# Patient Record
Sex: MALE | Race: ASIAN | NOT HISPANIC OR LATINO | URBAN - METROPOLITAN AREA
[De-identification: names, ages, dates, MRNs, and addresses within clinical notes are randomized per-mention and may not be internally consistent; named-entity substitution may affect disease eponyms.]

---

## 2021-01-01 ENCOUNTER — INPATIENT (INPATIENT)
Facility: HOSPITAL | Age: 86
LOS: 3 days | End: 2021-02-08
Attending: INTERNAL MEDICINE | Admitting: INTERNAL MEDICINE
Payer: MEDICARE

## 2021-01-01 VITALS
SYSTOLIC BLOOD PRESSURE: 79 MMHG | TEMPERATURE: 102 F | DIASTOLIC BLOOD PRESSURE: 60 MMHG | HEART RATE: 169 BPM | OXYGEN SATURATION: 77 % | RESPIRATION RATE: 31 BRPM

## 2021-01-01 VITALS
DIASTOLIC BLOOD PRESSURE: 99 MMHG | OXYGEN SATURATION: 100 % | HEART RATE: 62 BPM | SYSTOLIC BLOOD PRESSURE: 172 MMHG | RESPIRATION RATE: 18 BRPM

## 2021-01-01 DIAGNOSIS — Z51.5 ENCOUNTER FOR PALLIATIVE CARE: ICD-10-CM

## 2021-01-01 DIAGNOSIS — I61.9 NONTRAUMATIC INTRACEREBRAL HEMORRHAGE, UNSPECIFIED: ICD-10-CM

## 2021-01-01 DIAGNOSIS — J96.01 ACUTE RESPIRATORY FAILURE WITH HYPOXIA: ICD-10-CM

## 2021-01-01 LAB
A1C WITH ESTIMATED AVERAGE GLUCOSE RESULT: 5.5 % — SIGNIFICANT CHANGE UP (ref 4–5.6)
ALBUMIN SERPL ELPH-MCNC: 1.8 G/DL — LOW (ref 3.3–5)
ALBUMIN SERPL ELPH-MCNC: 2 G/DL — LOW (ref 3.3–5)
ALBUMIN SERPL ELPH-MCNC: 3.7 G/DL — SIGNIFICANT CHANGE UP (ref 3.3–5)
ALP SERPL-CCNC: 50 U/L — SIGNIFICANT CHANGE UP (ref 40–120)
ALP SERPL-CCNC: 77 U/L — SIGNIFICANT CHANGE UP (ref 40–120)
ALT FLD-CCNC: 22 U/L — SIGNIFICANT CHANGE UP (ref 4–41)
ALT FLD-CCNC: 31 U/L — SIGNIFICANT CHANGE UP (ref 4–41)
ANION GAP SERPL CALC-SCNC: 11 MMOL/L — SIGNIFICANT CHANGE UP (ref 7–14)
ANION GAP SERPL CALC-SCNC: 11 MMOL/L — SIGNIFICANT CHANGE UP (ref 7–14)
ANION GAP SERPL CALC-SCNC: 13 MMOL/L — SIGNIFICANT CHANGE UP (ref 7–14)
ANION GAP SERPL CALC-SCNC: 21 MMOL/L — HIGH (ref 7–14)
ANION GAP SERPL CALC-SCNC: 9 MMOL/L — SIGNIFICANT CHANGE UP (ref 7–14)
APTT BLD: 32.2 SEC — SIGNIFICANT CHANGE UP (ref 27–36.3)
APTT BLD: 37 SEC — HIGH (ref 27–36.3)
APTT BLD: 39.1 SEC — HIGH (ref 27–36.3)
APTT BLD: 43.5 SEC — HIGH (ref 27–36.3)
AST SERPL-CCNC: 23 U/L — SIGNIFICANT CHANGE UP (ref 4–40)
AST SERPL-CCNC: 64 U/L — HIGH (ref 4–40)
BASE EXCESS BLDV CALC-SCNC: -1 MMOL/L — SIGNIFICANT CHANGE UP (ref -3–2)
BASOPHILS # BLD AUTO: 0 K/UL — SIGNIFICANT CHANGE UP (ref 0–0.2)
BASOPHILS # BLD AUTO: 0.05 K/UL — SIGNIFICANT CHANGE UP (ref 0–0.2)
BASOPHILS NFR BLD AUTO: 0 % — SIGNIFICANT CHANGE UP (ref 0–2)
BASOPHILS NFR BLD AUTO: 0.5 % — SIGNIFICANT CHANGE UP (ref 0–2)
BILIRUB SERPL-MCNC: 0.6 MG/DL — SIGNIFICANT CHANGE UP (ref 0.2–1.2)
BILIRUB SERPL-MCNC: 1.2 MG/DL — SIGNIFICANT CHANGE UP (ref 0.2–1.2)
BLD GP AB SCN SERPL QL: NEGATIVE — SIGNIFICANT CHANGE UP
BLD GP AB SCN SERPL QL: NEGATIVE — SIGNIFICANT CHANGE UP
BLOOD GAS ARTERIAL COMPREHENSIVE RESULT: SIGNIFICANT CHANGE UP
BLOOD GAS ARTERIAL COMPREHENSIVE RESULT: SIGNIFICANT CHANGE UP
BLOOD GAS ARTERIAL COMPREHENSIVE WITH CREATININE RESULT: SIGNIFICANT CHANGE UP
BUN SERPL-MCNC: 18 MG/DL — SIGNIFICANT CHANGE UP (ref 7–23)
BUN SERPL-MCNC: 19 MG/DL — SIGNIFICANT CHANGE UP (ref 7–23)
BUN SERPL-MCNC: 24 MG/DL — HIGH (ref 7–23)
BUN SERPL-MCNC: 29 MG/DL — HIGH (ref 7–23)
BUN SERPL-MCNC: 36 MG/DL — HIGH (ref 7–23)
BUN SERPL-MCNC: 38 MG/DL — HIGH (ref 7–23)
BUN SERPL-MCNC: 44 MG/DL — HIGH (ref 7–23)
CALCIUM SERPL-MCNC: 6.4 MG/DL — CRITICAL LOW (ref 8.4–10.5)
CALCIUM SERPL-MCNC: 6.5 MG/DL — CRITICAL LOW (ref 8.4–10.5)
CALCIUM SERPL-MCNC: 6.5 MG/DL — CRITICAL LOW (ref 8.4–10.5)
CALCIUM SERPL-MCNC: 8.3 MG/DL — LOW (ref 8.4–10.5)
CALCIUM SERPL-MCNC: 8.6 MG/DL — SIGNIFICANT CHANGE UP (ref 8.4–10.5)
CALCIUM SERPL-MCNC: 8.7 MG/DL — SIGNIFICANT CHANGE UP (ref 8.4–10.5)
CALCIUM SERPL-MCNC: 9.1 MG/DL — SIGNIFICANT CHANGE UP (ref 8.4–10.5)
CHLORIDE SERPL-SCNC: 104 MMOL/L — SIGNIFICANT CHANGE UP (ref 98–107)
CHLORIDE SERPL-SCNC: 106 MMOL/L — SIGNIFICANT CHANGE UP (ref 98–107)
CHLORIDE SERPL-SCNC: 107 MMOL/L — SIGNIFICANT CHANGE UP (ref 98–107)
CHLORIDE SERPL-SCNC: 108 MMOL/L — HIGH (ref 98–107)
CHLORIDE SERPL-SCNC: 109 MMOL/L — HIGH (ref 98–107)
CHLORIDE SERPL-SCNC: 112 MMOL/L — HIGH (ref 98–107)
CHLORIDE SERPL-SCNC: 91 MMOL/L — LOW (ref 98–107)
CHOLEST SERPL-MCNC: 157 MG/DL — SIGNIFICANT CHANGE UP
CO2 SERPL-SCNC: 15 MMOL/L — LOW (ref 22–31)
CO2 SERPL-SCNC: 18 MMOL/L — LOW (ref 22–31)
CO2 SERPL-SCNC: 22 MMOL/L — SIGNIFICANT CHANGE UP (ref 22–31)
CO2 SERPL-SCNC: 23 MMOL/L — SIGNIFICANT CHANGE UP (ref 22–31)
CO2 SERPL-SCNC: 25 MMOL/L — SIGNIFICANT CHANGE UP (ref 22–31)
CREAT SERPL-MCNC: 1.02 MG/DL — SIGNIFICANT CHANGE UP (ref 0.5–1.3)
CREAT SERPL-MCNC: 1.26 MG/DL — SIGNIFICANT CHANGE UP (ref 0.5–1.3)
CREAT SERPL-MCNC: 2.1 MG/DL — HIGH (ref 0.5–1.3)
CREAT SERPL-MCNC: 2.18 MG/DL — HIGH (ref 0.5–1.3)
CREAT SERPL-MCNC: 2.28 MG/DL — HIGH (ref 0.5–1.3)
CREAT SERPL-MCNC: 2.36 MG/DL — HIGH (ref 0.5–1.3)
CREAT SERPL-MCNC: 2.93 MG/DL — HIGH (ref 0.5–1.3)
CULTURE RESULTS: SIGNIFICANT CHANGE UP
EOSINOPHIL # BLD AUTO: 0 K/UL — SIGNIFICANT CHANGE UP (ref 0–0.5)
EOSINOPHIL # BLD AUTO: 0.03 K/UL — SIGNIFICANT CHANGE UP (ref 0–0.5)
EOSINOPHIL NFR BLD AUTO: 0 % — SIGNIFICANT CHANGE UP (ref 0–6)
EOSINOPHIL NFR BLD AUTO: 0.3 % — SIGNIFICANT CHANGE UP (ref 0–6)
ESTIMATED AVERAGE GLUCOSE: 111 MG/DL — SIGNIFICANT CHANGE UP (ref 68–114)
GLUCOSE BLDC GLUCOMTR-MCNC: 101 MG/DL — HIGH (ref 70–99)
GLUCOSE BLDC GLUCOMTR-MCNC: 103 MG/DL — HIGH (ref 70–99)
GLUCOSE BLDC GLUCOMTR-MCNC: 120 MG/DL — HIGH (ref 70–99)
GLUCOSE BLDC GLUCOMTR-MCNC: 86 MG/DL — SIGNIFICANT CHANGE UP (ref 70–99)
GLUCOSE BLDC GLUCOMTR-MCNC: 95 MG/DL — SIGNIFICANT CHANGE UP (ref 70–99)
GLUCOSE SERPL-MCNC: 101 MG/DL — HIGH (ref 70–99)
GLUCOSE SERPL-MCNC: 105 MG/DL — HIGH (ref 70–99)
GLUCOSE SERPL-MCNC: 107 MG/DL — HIGH (ref 70–99)
GLUCOSE SERPL-MCNC: 108 MG/DL — HIGH (ref 70–99)
GLUCOSE SERPL-MCNC: 625 MG/DL — CRITICAL HIGH (ref 70–99)
GLUCOSE SERPL-MCNC: 84 MG/DL — SIGNIFICANT CHANGE UP (ref 70–99)
GLUCOSE SERPL-MCNC: 92 MG/DL — SIGNIFICANT CHANGE UP (ref 70–99)
GRAM STN FLD: SIGNIFICANT CHANGE UP
HCO3 BLDV-SCNC: 23 MMOL/L — SIGNIFICANT CHANGE UP (ref 20–27)
HCT VFR BLD CALC: 29.6 % — LOW (ref 39–50)
HCT VFR BLD CALC: 30.5 % — LOW (ref 39–50)
HCT VFR BLD CALC: 31.7 % — LOW (ref 39–50)
HCT VFR BLD CALC: 36.2 % — LOW (ref 39–50)
HCT VFR BLD CALC: 39 % — SIGNIFICANT CHANGE UP (ref 39–50)
HCT VFR BLD CALC: 39.4 % — SIGNIFICANT CHANGE UP (ref 39–50)
HCT VFR BLD CALC: 43.5 % — SIGNIFICANT CHANGE UP (ref 39–50)
HDLC SERPL-MCNC: 30 MG/DL — LOW
HGB BLD-MCNC: 11.5 G/DL — LOW (ref 13–17)
HGB BLD-MCNC: 12.3 G/DL — LOW (ref 13–17)
HGB BLD-MCNC: 12.4 G/DL — LOW (ref 13–17)
HGB BLD-MCNC: 14 G/DL — SIGNIFICANT CHANGE UP (ref 13–17)
HGB BLD-MCNC: 9.5 G/DL — LOW (ref 13–17)
HGB BLD-MCNC: 9.5 G/DL — LOW (ref 13–17)
HGB BLD-MCNC: 9.8 G/DL — LOW (ref 13–17)
IANC: 6.97 K/UL — SIGNIFICANT CHANGE UP (ref 1.5–8.5)
IANC: 9.65 K/UL — HIGH (ref 1.5–8.5)
IMM GRANULOCYTES NFR BLD AUTO: 0.7 % — SIGNIFICANT CHANGE UP (ref 0–1.5)
INR BLD: 1.25 RATIO — HIGH (ref 0.88–1.16)
INR BLD: 1.25 RATIO — HIGH (ref 0.88–1.16)
INR BLD: 1.31 RATIO — HIGH (ref 0.88–1.16)
INR BLD: 1.47 RATIO — HIGH (ref 0.88–1.16)
LIPID PNL WITH DIRECT LDL SERPL: 89 MG/DL — SIGNIFICANT CHANGE UP
LYMPHOCYTES # BLD AUTO: 1.02 K/UL — SIGNIFICANT CHANGE UP (ref 1–3.3)
LYMPHOCYTES # BLD AUTO: 2.48 K/UL — SIGNIFICANT CHANGE UP (ref 1–3.3)
LYMPHOCYTES # BLD AUTO: 24.1 % — SIGNIFICANT CHANGE UP (ref 13–44)
LYMPHOCYTES # BLD AUTO: 9 % — LOW (ref 13–44)
MAGNESIUM SERPL-MCNC: 1.5 MG/DL — LOW (ref 1.6–2.6)
MAGNESIUM SERPL-MCNC: 1.6 MG/DL — SIGNIFICANT CHANGE UP (ref 1.6–2.6)
MAGNESIUM SERPL-MCNC: 1.8 MG/DL — SIGNIFICANT CHANGE UP (ref 1.6–2.6)
MAGNESIUM SERPL-MCNC: 1.9 MG/DL — SIGNIFICANT CHANGE UP (ref 1.6–2.6)
MAGNESIUM SERPL-MCNC: 2.1 MG/DL — SIGNIFICANT CHANGE UP (ref 1.6–2.6)
MCHC RBC-ENTMCNC: 29.8 PG — SIGNIFICANT CHANGE UP (ref 27–34)
MCHC RBC-ENTMCNC: 29.9 PG — SIGNIFICANT CHANGE UP (ref 27–34)
MCHC RBC-ENTMCNC: 29.9 PG — SIGNIFICANT CHANGE UP (ref 27–34)
MCHC RBC-ENTMCNC: 30 PG — SIGNIFICANT CHANGE UP (ref 27–34)
MCHC RBC-ENTMCNC: 30.2 PG — SIGNIFICANT CHANGE UP (ref 27–34)
MCHC RBC-ENTMCNC: 30.3 PG — SIGNIFICANT CHANGE UP (ref 27–34)
MCHC RBC-ENTMCNC: 30.5 PG — SIGNIFICANT CHANGE UP (ref 27–34)
MCHC RBC-ENTMCNC: 30.9 GM/DL — LOW (ref 32–36)
MCHC RBC-ENTMCNC: 31.1 GM/DL — LOW (ref 32–36)
MCHC RBC-ENTMCNC: 31.5 GM/DL — LOW (ref 32–36)
MCHC RBC-ENTMCNC: 31.5 GM/DL — LOW (ref 32–36)
MCHC RBC-ENTMCNC: 31.8 GM/DL — LOW (ref 32–36)
MCHC RBC-ENTMCNC: 32.1 GM/DL — SIGNIFICANT CHANGE UP (ref 32–36)
MCHC RBC-ENTMCNC: 32.2 GM/DL — SIGNIFICANT CHANGE UP (ref 32–36)
MCV RBC AUTO: 92.8 FL — SIGNIFICANT CHANGE UP (ref 80–100)
MCV RBC AUTO: 94.5 FL — SIGNIFICANT CHANGE UP (ref 80–100)
MCV RBC AUTO: 94.7 FL — SIGNIFICANT CHANGE UP (ref 80–100)
MCV RBC AUTO: 94.9 FL — SIGNIFICANT CHANGE UP (ref 80–100)
MCV RBC AUTO: 95.2 FL — SIGNIFICANT CHANGE UP (ref 80–100)
MCV RBC AUTO: 96.8 FL — SIGNIFICANT CHANGE UP (ref 80–100)
MCV RBC AUTO: 98.1 FL — SIGNIFICANT CHANGE UP (ref 80–100)
MONOCYTES # BLD AUTO: 0.41 K/UL — SIGNIFICANT CHANGE UP (ref 0–0.9)
MONOCYTES # BLD AUTO: 0.7 K/UL — SIGNIFICANT CHANGE UP (ref 0–0.9)
MONOCYTES NFR BLD AUTO: 3.6 % — SIGNIFICANT CHANGE UP (ref 2–14)
MONOCYTES NFR BLD AUTO: 6.8 % — SIGNIFICANT CHANGE UP (ref 2–14)
NEUTROPHILS # BLD AUTO: 6.97 K/UL — SIGNIFICANT CHANGE UP (ref 1.8–7.4)
NEUTROPHILS # BLD AUTO: 9.17 K/UL — HIGH (ref 1.8–7.4)
NEUTROPHILS NFR BLD AUTO: 67.6 % — SIGNIFICANT CHANGE UP (ref 43–77)
NEUTROPHILS NFR BLD AUTO: 73.9 % — SIGNIFICANT CHANGE UP (ref 43–77)
NON HDL CHOLESTEROL: 127 MG/DL — SIGNIFICANT CHANGE UP
NRBC # BLD: 0 /100 WBCS — SIGNIFICANT CHANGE UP
NRBC # FLD: 0 K/UL — SIGNIFICANT CHANGE UP
PCO2 BLDV: 46 MMHG — SIGNIFICANT CHANGE UP (ref 41–51)
PH BLDV: 7.34 — SIGNIFICANT CHANGE UP (ref 7.32–7.43)
PHOSPHATE SERPL-MCNC: 3.2 MG/DL — SIGNIFICANT CHANGE UP (ref 2.5–4.5)
PHOSPHATE SERPL-MCNC: 3.2 MG/DL — SIGNIFICANT CHANGE UP (ref 2.5–4.5)
PHOSPHATE SERPL-MCNC: 3.5 MG/DL — SIGNIFICANT CHANGE UP (ref 2.5–4.5)
PHOSPHATE SERPL-MCNC: 3.6 MG/DL — SIGNIFICANT CHANGE UP (ref 2.5–4.5)
PHOSPHATE SERPL-MCNC: 5 MG/DL — HIGH (ref 2.5–4.5)
PLATELET # BLD AUTO: 112 K/UL — LOW (ref 150–400)
PLATELET # BLD AUTO: 145 K/UL — LOW (ref 150–400)
PLATELET # BLD AUTO: 173 K/UL — SIGNIFICANT CHANGE UP (ref 150–400)
PLATELET # BLD AUTO: 260 K/UL — SIGNIFICANT CHANGE UP (ref 150–400)
PLATELET # BLD AUTO: 80 K/UL — LOW (ref 150–400)
PLATELET # BLD AUTO: 85 K/UL — LOW (ref 150–400)
PLATELET # BLD AUTO: 92 K/UL — LOW (ref 150–400)
PO2 BLDV: 49 MMHG — HIGH (ref 35–40)
POTASSIUM SERPL-MCNC: 3.6 MMOL/L — SIGNIFICANT CHANGE UP (ref 3.5–5.3)
POTASSIUM SERPL-MCNC: 3.7 MMOL/L — SIGNIFICANT CHANGE UP (ref 3.5–5.3)
POTASSIUM SERPL-MCNC: 4 MMOL/L — SIGNIFICANT CHANGE UP (ref 3.5–5.3)
POTASSIUM SERPL-MCNC: 4.2 MMOL/L — SIGNIFICANT CHANGE UP (ref 3.5–5.3)
POTASSIUM SERPL-MCNC: 5 MMOL/L — SIGNIFICANT CHANGE UP (ref 3.5–5.3)
POTASSIUM SERPL-SCNC: 3.6 MMOL/L — SIGNIFICANT CHANGE UP (ref 3.5–5.3)
POTASSIUM SERPL-SCNC: 3.7 MMOL/L — SIGNIFICANT CHANGE UP (ref 3.5–5.3)
POTASSIUM SERPL-SCNC: 4 MMOL/L — SIGNIFICANT CHANGE UP (ref 3.5–5.3)
POTASSIUM SERPL-SCNC: 4.2 MMOL/L — SIGNIFICANT CHANGE UP (ref 3.5–5.3)
POTASSIUM SERPL-SCNC: 5 MMOL/L — SIGNIFICANT CHANGE UP (ref 3.5–5.3)
PROT SERPL-MCNC: 5 G/DL — LOW (ref 6–8.3)
PROT SERPL-MCNC: 6.6 G/DL — SIGNIFICANT CHANGE UP (ref 6–8.3)
PROTHROM AB SERPL-ACNC: 14.2 SEC — HIGH (ref 10.6–13.6)
PROTHROM AB SERPL-ACNC: 14.2 SEC — HIGH (ref 10.6–13.6)
PROTHROM AB SERPL-ACNC: 14.7 SEC — HIGH (ref 10.6–13.6)
PROTHROM AB SERPL-ACNC: 16.5 SEC — HIGH (ref 10.6–13.6)
RBC # BLD: 3.11 M/UL — LOW (ref 4.2–5.8)
RBC # BLD: 3.15 M/UL — LOW (ref 4.2–5.8)
RBC # BLD: 3.23 M/UL — LOW (ref 4.2–5.8)
RBC # BLD: 3.83 M/UL — LOW (ref 4.2–5.8)
RBC # BLD: 4.11 M/UL — LOW (ref 4.2–5.8)
RBC # BLD: 4.16 M/UL — LOW (ref 4.2–5.8)
RBC # BLD: 4.69 M/UL — SIGNIFICANT CHANGE UP (ref 4.2–5.8)
RBC # FLD: 13.8 % — SIGNIFICANT CHANGE UP (ref 10.3–14.5)
RBC # FLD: 14.2 % — SIGNIFICANT CHANGE UP (ref 10.3–14.5)
RBC # FLD: 14.3 % — SIGNIFICANT CHANGE UP (ref 10.3–14.5)
RBC # FLD: 14.5 % — SIGNIFICANT CHANGE UP (ref 10.3–14.5)
RBC # FLD: 14.6 % — HIGH (ref 10.3–14.5)
RH IG SCN BLD-IMP: POSITIVE — SIGNIFICANT CHANGE UP
SAO2 % BLDV: 78.9 % — SIGNIFICANT CHANGE UP (ref 60–85)
SARS-COV-2 RNA SPEC QL NAA+PROBE: SIGNIFICANT CHANGE UP
SODIUM SERPL-SCNC: 127 MMOL/L — LOW (ref 135–145)
SODIUM SERPL-SCNC: 135 MMOL/L — SIGNIFICANT CHANGE UP (ref 135–145)
SODIUM SERPL-SCNC: 136 MMOL/L — SIGNIFICANT CHANGE UP (ref 135–145)
SODIUM SERPL-SCNC: 140 MMOL/L — SIGNIFICANT CHANGE UP (ref 135–145)
SODIUM SERPL-SCNC: 140 MMOL/L — SIGNIFICANT CHANGE UP (ref 135–145)
SODIUM SERPL-SCNC: 141 MMOL/L — SIGNIFICANT CHANGE UP (ref 135–145)
SODIUM SERPL-SCNC: 142 MMOL/L — SIGNIFICANT CHANGE UP (ref 135–145)
SPECIMEN SOURCE: SIGNIFICANT CHANGE UP
SPECIMEN SOURCE: SIGNIFICANT CHANGE UP
TRIGL SERPL-MCNC: 190 MG/DL — HIGH
TROPONIN T, HIGH SENSITIVITY RESULT: 9 NG/L — SIGNIFICANT CHANGE UP
VANCOMYCIN FLD-MCNC: 16.3 UG/ML — SIGNIFICANT CHANGE UP
VANCOMYCIN FLD-MCNC: 5.5 UG/ML — SIGNIFICANT CHANGE UP
WBC # BLD: 10.3 K/UL — SIGNIFICANT CHANGE UP (ref 3.8–10.5)
WBC # BLD: 10.46 K/UL — SIGNIFICANT CHANGE UP (ref 3.8–10.5)
WBC # BLD: 11.31 K/UL — HIGH (ref 3.8–10.5)
WBC # BLD: 11.57 K/UL — HIGH (ref 3.8–10.5)
WBC # BLD: 15.12 K/UL — HIGH (ref 3.8–10.5)
WBC # BLD: 16 K/UL — HIGH (ref 3.8–10.5)
WBC # BLD: 22.68 K/UL — HIGH (ref 3.8–10.5)
WBC # FLD AUTO: 10.3 K/UL — SIGNIFICANT CHANGE UP (ref 3.8–10.5)
WBC # FLD AUTO: 10.46 K/UL — SIGNIFICANT CHANGE UP (ref 3.8–10.5)
WBC # FLD AUTO: 11.31 K/UL — HIGH (ref 3.8–10.5)
WBC # FLD AUTO: 11.57 K/UL — HIGH (ref 3.8–10.5)
WBC # FLD AUTO: 15.12 K/UL — HIGH (ref 3.8–10.5)
WBC # FLD AUTO: 16 K/UL — HIGH (ref 3.8–10.5)
WBC # FLD AUTO: 22.68 K/UL — HIGH (ref 3.8–10.5)

## 2021-01-01 PROCEDURE — 99291 CRITICAL CARE FIRST HOUR: CPT

## 2021-01-01 PROCEDURE — 99292 CRITICAL CARE ADDL 30 MIN: CPT

## 2021-01-01 PROCEDURE — 99291 CRITICAL CARE FIRST HOUR: CPT | Mod: 25

## 2021-01-01 PROCEDURE — 70450 CT HEAD/BRAIN W/O DYE: CPT | Mod: 26

## 2021-01-01 PROCEDURE — 99232 SBSQ HOSP IP/OBS MODERATE 35: CPT | Mod: GC

## 2021-01-01 PROCEDURE — 99223 1ST HOSP IP/OBS HIGH 75: CPT | Mod: GC

## 2021-01-01 PROCEDURE — 71045 X-RAY EXAM CHEST 1 VIEW: CPT | Mod: 26

## 2021-01-01 RX ORDER — METOPROLOL TARTRATE 50 MG
5 TABLET ORAL ONCE
Refills: 0 | Status: COMPLETED | OUTPATIENT
Start: 2021-01-01 | End: 2021-01-01

## 2021-01-01 RX ORDER — ADENOSINE 3 MG/ML
12 INJECTION INTRAVENOUS ONCE
Refills: 0 | Status: COMPLETED | OUTPATIENT
Start: 2021-01-01 | End: 2021-01-01

## 2021-01-01 RX ORDER — SUCCINYLCHOLINE CHLORIDE 100 MG/5ML
100 SYRINGE (ML) INTRAVENOUS ONCE
Refills: 0 | Status: COMPLETED | OUTPATIENT
Start: 2021-01-01 | End: 2021-01-01

## 2021-01-01 RX ORDER — PIPERACILLIN AND TAZOBACTAM 4; .5 G/20ML; G/20ML
3.38 INJECTION, POWDER, LYOPHILIZED, FOR SOLUTION INTRAVENOUS ONCE
Refills: 0 | Status: COMPLETED | OUTPATIENT
Start: 2021-01-01 | End: 2021-01-01

## 2021-01-01 RX ORDER — RIVAROXABAN 15 MG-20MG
1 KIT ORAL
Qty: 0 | Refills: 0 | DISCHARGE

## 2021-01-01 RX ORDER — ETOMIDATE 2 MG/ML
20 INJECTION INTRAVENOUS ONCE
Refills: 0 | Status: COMPLETED | OUTPATIENT
Start: 2021-01-01 | End: 2021-01-01

## 2021-01-01 RX ORDER — SODIUM CHLORIDE 9 MG/ML
1000 INJECTION, SOLUTION INTRAVENOUS
Refills: 0 | Status: DISCONTINUED | OUTPATIENT
Start: 2021-01-01 | End: 2021-01-01

## 2021-01-01 RX ORDER — ACETAMINOPHEN 500 MG
1000 TABLET ORAL ONCE
Refills: 0 | Status: COMPLETED | OUTPATIENT
Start: 2021-01-01 | End: 2021-01-01

## 2021-01-01 RX ORDER — DILTIAZEM HCL 120 MG
10 CAPSULE, EXT RELEASE 24 HR ORAL ONCE
Refills: 0 | Status: DISCONTINUED | OUTPATIENT
Start: 2021-01-01 | End: 2021-01-01

## 2021-01-01 RX ORDER — CARVEDILOL PHOSPHATE 80 MG/1
1 CAPSULE, EXTENDED RELEASE ORAL
Qty: 0 | Refills: 0 | DISCHARGE

## 2021-01-01 RX ORDER — MAGNESIUM SULFATE 500 MG/ML
2 VIAL (ML) INJECTION ONCE
Refills: 0 | Status: COMPLETED | OUTPATIENT
Start: 2021-01-01 | End: 2021-01-01

## 2021-01-01 RX ORDER — CALCIUM GLUCONATE 100 MG/ML
2 VIAL (ML) INTRAVENOUS ONCE
Refills: 0 | Status: COMPLETED | OUTPATIENT
Start: 2021-01-01 | End: 2021-01-01

## 2021-01-01 RX ORDER — DESMOPRESSIN ACETATE 0.1 MG/1
20 TABLET ORAL ONCE
Refills: 0 | Status: COMPLETED | OUTPATIENT
Start: 2021-01-01 | End: 2021-01-01

## 2021-01-01 RX ORDER — POTASSIUM CHLORIDE 20 MEQ
20 PACKET (EA) ORAL ONCE
Refills: 0 | Status: COMPLETED | OUTPATIENT
Start: 2021-01-01 | End: 2021-01-01

## 2021-01-01 RX ORDER — ACETAMINOPHEN 500 MG
650 TABLET ORAL EVERY 6 HOURS
Refills: 0 | Status: DISCONTINUED | OUTPATIENT
Start: 2021-01-01 | End: 2021-01-01

## 2021-01-01 RX ORDER — PANTOPRAZOLE SODIUM 20 MG/1
1 TABLET, DELAYED RELEASE ORAL
Qty: 0 | Refills: 0 | DISCHARGE

## 2021-01-01 RX ORDER — LABETALOL HCL 100 MG
10 TABLET ORAL ONCE
Refills: 0 | Status: COMPLETED | OUTPATIENT
Start: 2021-01-01 | End: 2021-01-01

## 2021-01-01 RX ORDER — ANDEXANET ALFA 200 MG/20ML
800 INJECTION, POWDER, LYOPHILIZED, FOR SOLUTION INTRAVENOUS ONCE
Refills: 0 | Status: COMPLETED | OUTPATIENT
Start: 2021-01-01 | End: 2021-01-01

## 2021-01-01 RX ORDER — DIGOXIN 250 MCG
0.5 TABLET ORAL ONCE
Refills: 0 | Status: DISCONTINUED | OUTPATIENT
Start: 2021-01-01 | End: 2021-01-01

## 2021-01-01 RX ORDER — LEVETIRACETAM 250 MG/1
1000 TABLET, FILM COATED ORAL ONCE
Refills: 0 | Status: COMPLETED | OUTPATIENT
Start: 2021-01-01 | End: 2021-01-01

## 2021-01-01 RX ORDER — ASPIRIN/CALCIUM CARB/MAGNESIUM 324 MG
1 TABLET ORAL
Qty: 0 | Refills: 0 | DISCHARGE

## 2021-01-01 RX ORDER — CHLORHEXIDINE GLUCONATE 213 G/1000ML
1 SOLUTION TOPICAL
Refills: 0 | Status: DISCONTINUED | OUTPATIENT
Start: 2021-01-01 | End: 2021-01-01

## 2021-01-01 RX ORDER — VANCOMYCIN HCL 1 G
1000 VIAL (EA) INTRAVENOUS ONCE
Refills: 0 | Status: COMPLETED | OUTPATIENT
Start: 2021-01-01 | End: 2021-01-01

## 2021-01-01 RX ORDER — ADENOSINE 3 MG/ML
6 INJECTION INTRAVENOUS ONCE
Refills: 0 | Status: COMPLETED | OUTPATIENT
Start: 2021-01-01 | End: 2021-01-01

## 2021-01-01 RX ORDER — AMIODARONE HYDROCHLORIDE 400 MG/1
200 TABLET ORAL DAILY
Refills: 0 | Status: DISCONTINUED | OUTPATIENT
Start: 2021-01-01 | End: 2021-01-01

## 2021-01-01 RX ORDER — AMIODARONE HYDROCHLORIDE 400 MG/1
150 TABLET ORAL ONCE
Refills: 0 | Status: COMPLETED | OUTPATIENT
Start: 2021-01-01 | End: 2021-01-01

## 2021-01-01 RX ORDER — FENTANYL CITRATE 50 UG/ML
0.5 INJECTION INTRAVENOUS
Qty: 2500 | Refills: 0 | Status: DISCONTINUED | OUTPATIENT
Start: 2021-01-01 | End: 2021-01-01

## 2021-01-01 RX ORDER — LEVETIRACETAM 250 MG/1
500 TABLET, FILM COATED ORAL EVERY 12 HOURS
Refills: 0 | Status: DISCONTINUED | OUTPATIENT
Start: 2021-01-01 | End: 2021-01-01

## 2021-01-01 RX ORDER — MAGNESIUM SULFATE 500 MG/ML
1 VIAL (ML) INJECTION ONCE
Refills: 0 | Status: COMPLETED | OUTPATIENT
Start: 2021-01-01 | End: 2021-01-01

## 2021-01-01 RX ORDER — ANDEXANET ALFA 200 MG/20ML
960 INJECTION, POWDER, LYOPHILIZED, FOR SOLUTION INTRAVENOUS ONCE
Refills: 0 | Status: COMPLETED | OUTPATIENT
Start: 2021-01-01 | End: 2021-01-01

## 2021-01-01 RX ORDER — PANTOPRAZOLE SODIUM 20 MG/1
40 TABLET, DELAYED RELEASE ORAL
Refills: 0 | Status: DISCONTINUED | OUTPATIENT
Start: 2021-01-01 | End: 2021-01-01

## 2021-01-01 RX ORDER — AMIODARONE HYDROCHLORIDE 400 MG/1
1 TABLET ORAL
Qty: 450 | Refills: 0 | Status: DISCONTINUED | OUTPATIENT
Start: 2021-01-01 | End: 2021-01-01

## 2021-01-01 RX ORDER — VANCOMYCIN HCL 1 G
1250 VIAL (EA) INTRAVENOUS ONCE
Refills: 0 | Status: COMPLETED | OUTPATIENT
Start: 2021-01-01 | End: 2021-01-01

## 2021-01-01 RX ORDER — DESMOPRESSIN ACETATE 0.1 MG/1
20 TABLET ORAL ONCE
Refills: 0 | Status: DISCONTINUED | OUTPATIENT
Start: 2021-01-01 | End: 2021-01-01

## 2021-01-01 RX ORDER — CALCIUM GLUCONATE 100 MG/ML
1 VIAL (ML) INTRAVENOUS ONCE
Refills: 0 | Status: COMPLETED | OUTPATIENT
Start: 2021-01-01 | End: 2021-01-01

## 2021-01-01 RX ORDER — NOREPINEPHRINE BITARTRATE/D5W 8 MG/250ML
0.05 PLASTIC BAG, INJECTION (ML) INTRAVENOUS
Qty: 8 | Refills: 0 | Status: DISCONTINUED | OUTPATIENT
Start: 2021-01-01 | End: 2021-01-01

## 2021-01-01 RX ORDER — PROPOFOL 10 MG/ML
20 INJECTION, EMULSION INTRAVENOUS
Qty: 1000 | Refills: 0 | Status: DISCONTINUED | OUTPATIENT
Start: 2021-01-01 | End: 2021-01-01

## 2021-01-01 RX ORDER — CHLORHEXIDINE GLUCONATE 213 G/1000ML
15 SOLUTION TOPICAL EVERY 12 HOURS
Refills: 0 | Status: DISCONTINUED | OUTPATIENT
Start: 2021-01-01 | End: 2021-01-01

## 2021-01-01 RX ORDER — PIPERACILLIN AND TAZOBACTAM 4; .5 G/20ML; G/20ML
3.38 INJECTION, POWDER, LYOPHILIZED, FOR SOLUTION INTRAVENOUS EVERY 8 HOURS
Refills: 0 | Status: DISCONTINUED | OUTPATIENT
Start: 2021-01-01 | End: 2021-01-01

## 2021-01-01 RX ORDER — AMIODARONE HYDROCHLORIDE 400 MG/1
0.5 TABLET ORAL
Qty: 450 | Refills: 0 | Status: DISCONTINUED | OUTPATIENT
Start: 2021-01-01 | End: 2021-01-01

## 2021-01-01 RX ORDER — PHENYLEPHRINE HYDROCHLORIDE 10 MG/ML
0.1 INJECTION INTRAVENOUS
Qty: 40 | Refills: 0 | Status: DISCONTINUED | OUTPATIENT
Start: 2021-01-01 | End: 2021-01-01

## 2021-01-01 RX ADMIN — Medication 50 GRAM(S): at 05:13

## 2021-01-01 RX ADMIN — PIPERACILLIN AND TAZOBACTAM 25 GRAM(S): 4; .5 INJECTION, POWDER, LYOPHILIZED, FOR SOLUTION INTRAVENOUS at 21:17

## 2021-01-01 RX ADMIN — LEVETIRACETAM 400 MILLIGRAM(S): 250 TABLET, FILM COATED ORAL at 17:23

## 2021-01-01 RX ADMIN — PROPOFOL 9.6 MICROGRAM(S)/KG/MIN: 10 INJECTION, EMULSION INTRAVENOUS at 13:17

## 2021-01-01 RX ADMIN — ANDEXANET ALFA 800 MILLIGRAM(S): 200 INJECTION, POWDER, LYOPHILIZED, FOR SOLUTION INTRAVENOUS at 16:30

## 2021-01-01 RX ADMIN — CHLORHEXIDINE GLUCONATE 1 APPLICATION(S): 213 SOLUTION TOPICAL at 05:48

## 2021-01-01 RX ADMIN — CHLORHEXIDINE GLUCONATE 15 MILLILITER(S): 213 SOLUTION TOPICAL at 04:21

## 2021-01-01 RX ADMIN — CHLORHEXIDINE GLUCONATE 15 MILLILITER(S): 213 SOLUTION TOPICAL at 17:23

## 2021-01-01 RX ADMIN — PANTOPRAZOLE SODIUM 40 MILLIGRAM(S): 20 TABLET, DELAYED RELEASE ORAL at 04:29

## 2021-01-01 RX ADMIN — Medication 7.5 MICROGRAM(S)/KG/MIN: at 15:43

## 2021-01-01 RX ADMIN — PHENYLEPHRINE HYDROCHLORIDE 3 MICROGRAM(S)/KG/MIN: 10 INJECTION INTRAVENOUS at 14:25

## 2021-01-01 RX ADMIN — PANTOPRAZOLE SODIUM 40 MILLIGRAM(S): 20 TABLET, DELAYED RELEASE ORAL at 17:22

## 2021-01-01 RX ADMIN — PANTOPRAZOLE SODIUM 40 MILLIGRAM(S): 20 TABLET, DELAYED RELEASE ORAL at 04:20

## 2021-01-01 RX ADMIN — PROPOFOL 9.6 MICROGRAM(S)/KG/MIN: 10 INJECTION, EMULSION INTRAVENOUS at 12:33

## 2021-01-01 RX ADMIN — PIPERACILLIN AND TAZOBACTAM 25 GRAM(S): 4; .5 INJECTION, POWDER, LYOPHILIZED, FOR SOLUTION INTRAVENOUS at 16:07

## 2021-01-01 RX ADMIN — FENTANYL CITRATE 4 MICROGRAM(S)/KG/HR: 50 INJECTION INTRAVENOUS at 08:28

## 2021-01-01 RX ADMIN — Medication 5 MILLIGRAM(S): at 14:05

## 2021-01-01 RX ADMIN — LEVETIRACETAM 400 MILLIGRAM(S): 250 TABLET, FILM COATED ORAL at 04:32

## 2021-01-01 RX ADMIN — PANTOPRAZOLE SODIUM 40 MILLIGRAM(S): 20 TABLET, DELAYED RELEASE ORAL at 17:21

## 2021-01-01 RX ADMIN — Medication 10 MILLIGRAM(S): at 13:18

## 2021-01-01 RX ADMIN — PIPERACILLIN AND TAZOBACTAM 25 GRAM(S): 4; .5 INJECTION, POWDER, LYOPHILIZED, FOR SOLUTION INTRAVENOUS at 14:25

## 2021-01-01 RX ADMIN — ADENOSINE 6 MILLIGRAM(S): 3 INJECTION INTRAVENOUS at 13:55

## 2021-01-01 RX ADMIN — CHLORHEXIDINE GLUCONATE 15 MILLILITER(S): 213 SOLUTION TOPICAL at 16:07

## 2021-01-01 RX ADMIN — CHLORHEXIDINE GLUCONATE 15 MILLILITER(S): 213 SOLUTION TOPICAL at 17:24

## 2021-01-01 RX ADMIN — FENTANYL CITRATE 4 MICROGRAM(S)/KG/HR: 50 INJECTION INTRAVENOUS at 13:56

## 2021-01-01 RX ADMIN — PROPOFOL 9.6 MICROGRAM(S)/KG/MIN: 10 INJECTION, EMULSION INTRAVENOUS at 07:54

## 2021-01-01 RX ADMIN — AMIODARONE HYDROCHLORIDE 16.7 MG/MIN: 400 TABLET ORAL at 08:29

## 2021-01-01 RX ADMIN — PIPERACILLIN AND TAZOBACTAM 25 GRAM(S): 4; .5 INJECTION, POWDER, LYOPHILIZED, FOR SOLUTION INTRAVENOUS at 04:21

## 2021-01-01 RX ADMIN — AMIODARONE HYDROCHLORIDE 200 MILLIGRAM(S): 400 TABLET ORAL at 04:20

## 2021-01-01 RX ADMIN — CHLORHEXIDINE GLUCONATE 15 MILLILITER(S): 213 SOLUTION TOPICAL at 17:21

## 2021-01-01 RX ADMIN — PANTOPRAZOLE SODIUM 40 MILLIGRAM(S): 20 TABLET, DELAYED RELEASE ORAL at 17:24

## 2021-01-01 RX ADMIN — PIPERACILLIN AND TAZOBACTAM 25 GRAM(S): 4; .5 INJECTION, POWDER, LYOPHILIZED, FOR SOLUTION INTRAVENOUS at 05:16

## 2021-01-01 RX ADMIN — Medication 400 MILLIGRAM(S): at 18:41

## 2021-01-01 RX ADMIN — LEVETIRACETAM 1000 MILLIGRAM(S): 250 TABLET, FILM COATED ORAL at 13:53

## 2021-01-01 RX ADMIN — PIPERACILLIN AND TAZOBACTAM 25 GRAM(S): 4; .5 INJECTION, POWDER, LYOPHILIZED, FOR SOLUTION INTRAVENOUS at 22:00

## 2021-01-01 RX ADMIN — PROPOFOL 9.6 MICROGRAM(S)/KG/MIN: 10 INJECTION, EMULSION INTRAVENOUS at 15:03

## 2021-01-01 RX ADMIN — FENTANYL CITRATE 4 MICROGRAM(S)/KG/HR: 50 INJECTION INTRAVENOUS at 01:15

## 2021-01-01 RX ADMIN — CHLORHEXIDINE GLUCONATE 1 APPLICATION(S): 213 SOLUTION TOPICAL at 04:48

## 2021-01-01 RX ADMIN — LEVETIRACETAM 400 MILLIGRAM(S): 250 TABLET, FILM COATED ORAL at 04:20

## 2021-01-01 RX ADMIN — CHLORHEXIDINE GLUCONATE 1 APPLICATION(S): 213 SOLUTION TOPICAL at 06:29

## 2021-01-01 RX ADMIN — LEVETIRACETAM 400 MILLIGRAM(S): 250 TABLET, FILM COATED ORAL at 16:07

## 2021-01-01 RX ADMIN — LEVETIRACETAM 400 MILLIGRAM(S): 250 TABLET, FILM COATED ORAL at 13:28

## 2021-01-01 RX ADMIN — CHLORHEXIDINE GLUCONATE 1 APPLICATION(S): 213 SOLUTION TOPICAL at 05:21

## 2021-01-01 RX ADMIN — ANDEXANET ALFA 960 MILLIGRAM(S): 200 INJECTION, POWDER, LYOPHILIZED, FOR SOLUTION INTRAVENOUS at 18:20

## 2021-01-01 RX ADMIN — CHLORHEXIDINE GLUCONATE 15 MILLILITER(S): 213 SOLUTION TOPICAL at 04:29

## 2021-01-01 RX ADMIN — ETOMIDATE 20 MILLIGRAM(S): 2 INJECTION INTRAVENOUS at 12:38

## 2021-01-01 RX ADMIN — ANDEXANET ALFA 177.78 MILLIGRAM(S): 200 INJECTION, POWDER, LYOPHILIZED, FOR SOLUTION INTRAVENOUS at 15:57

## 2021-01-01 RX ADMIN — CHLORHEXIDINE GLUCONATE 15 MILLILITER(S): 213 SOLUTION TOPICAL at 05:14

## 2021-01-01 RX ADMIN — Medication 20 MILLIEQUIVALENT(S): at 09:17

## 2021-01-01 RX ADMIN — Medication 7.5 MICROGRAM(S)/KG/MIN: at 15:02

## 2021-01-01 RX ADMIN — PIPERACILLIN AND TAZOBACTAM 25 GRAM(S): 4; .5 INJECTION, POWDER, LYOPHILIZED, FOR SOLUTION INTRAVENOUS at 13:48

## 2021-01-01 RX ADMIN — Medication 250 MILLIGRAM(S): at 21:20

## 2021-01-01 RX ADMIN — LEVETIRACETAM 400 MILLIGRAM(S): 250 TABLET, FILM COATED ORAL at 06:46

## 2021-01-01 RX ADMIN — Medication 400 MILLIGRAM(S): at 18:17

## 2021-01-01 RX ADMIN — LEVETIRACETAM 400 MILLIGRAM(S): 250 TABLET, FILM COATED ORAL at 05:14

## 2021-01-01 RX ADMIN — PANTOPRAZOLE SODIUM 40 MILLIGRAM(S): 20 TABLET, DELAYED RELEASE ORAL at 05:15

## 2021-01-01 RX ADMIN — Medication 5 MILLIGRAM(S): at 00:20

## 2021-01-01 RX ADMIN — PANTOPRAZOLE SODIUM 40 MILLIGRAM(S): 20 TABLET, DELAYED RELEASE ORAL at 16:07

## 2021-01-01 RX ADMIN — ADENOSINE 12 MILLIGRAM(S): 3 INJECTION INTRAVENOUS at 14:00

## 2021-01-01 RX ADMIN — Medication 200 GRAM(S): at 08:28

## 2021-01-01 RX ADMIN — PHENYLEPHRINE HYDROCHLORIDE 3 MICROGRAM(S)/KG/MIN: 10 INJECTION INTRAVENOUS at 08:28

## 2021-01-01 RX ADMIN — FENTANYL CITRATE 4 MICROGRAM(S)/KG/HR: 50 INJECTION INTRAVENOUS at 06:46

## 2021-01-01 RX ADMIN — Medication 400 MILLIGRAM(S): at 05:17

## 2021-01-01 RX ADMIN — DESMOPRESSIN ACETATE 20 MICROGRAM(S): 0.1 TABLET ORAL at 15:57

## 2021-01-01 RX ADMIN — AMIODARONE HYDROCHLORIDE 33.3 MG/MIN: 400 TABLET ORAL at 17:25

## 2021-01-01 RX ADMIN — Medication 100 GRAM(S): at 04:45

## 2021-01-01 RX ADMIN — DESMOPRESSIN ACETATE 220 MICROGRAM(S): 0.1 TABLET ORAL at 15:14

## 2021-01-01 RX ADMIN — CHLORHEXIDINE GLUCONATE 15 MILLILITER(S): 213 SOLUTION TOPICAL at 06:32

## 2021-01-01 RX ADMIN — Medication 650 MILLIGRAM(S): at 09:00

## 2021-01-01 RX ADMIN — Medication 100 MILLIGRAM(S): at 12:38

## 2021-01-01 RX ADMIN — PIPERACILLIN AND TAZOBACTAM 200 GRAM(S): 4; .5 INJECTION, POWDER, LYOPHILIZED, FOR SOLUTION INTRAVENOUS at 06:03

## 2021-01-01 RX ADMIN — LEVETIRACETAM 400 MILLIGRAM(S): 250 TABLET, FILM COATED ORAL at 17:21

## 2021-01-01 RX ADMIN — ANDEXANET ALFA 48 MILLIGRAM(S): 200 INJECTION, POWDER, LYOPHILIZED, FOR SOLUTION INTRAVENOUS at 16:30

## 2021-01-01 RX ADMIN — PHENYLEPHRINE HYDROCHLORIDE 3 MICROGRAM(S)/KG/MIN: 10 INJECTION INTRAVENOUS at 17:22

## 2021-01-01 RX ADMIN — Medication 250 MILLIGRAM(S): at 04:52

## 2021-01-01 RX ADMIN — AMIODARONE HYDROCHLORIDE 618 MILLIGRAM(S): 400 TABLET ORAL at 13:45

## 2021-01-01 RX ADMIN — LEVETIRACETAM 400 MILLIGRAM(S): 250 TABLET, FILM COATED ORAL at 23:33

## 2021-01-01 RX ADMIN — Medication 100 GRAM(S): at 04:44

## 2021-01-01 RX ADMIN — FENTANYL CITRATE 4 MICROGRAM(S)/KG/HR: 50 INJECTION INTRAVENOUS at 17:21

## 2021-02-04 NOTE — ED PROVIDER NOTE - CLINICAL SUMMARY MEDICAL DECISION MAKING FREE TEXT BOX
PGY3/MD Ashlie. 86 yo, CODE stroke, but given pt unresponsive, not moving left extremity, fixed gaze, likely hemorragic stroke. airway protection by ETT intubation, stat CT head

## 2021-02-04 NOTE — CONSULT NOTE ADULT - ASSESSMENT
86yo male with left basal ganglia/corona radiata acute parenchymal hemorrhage with acute on chronic subdural hematoma     PLAN:   - will discuss with family GOC   - given age, hx, would not offer surgery at this time as patient's exam is followable off sedation and has a large dominant hemisphere stroke   - keppra  - neurology   - hold all AC   - rpt scan in 6 hours  - MRI at some point per neuro   - will update team with families goals of care    Case discussed with attending neurosurgeon.   86yo male with left basal ganglia/corona radiata acute parenchymal hemorrhage with acute on chronic subdural hematoma     PLAN:   - will discuss with family GOC   - given age, hx, would not offer surgery as patient's exam is followable off sedation and has a large dominant hemisphere stroke. He is not a surgical candidate.   - keppra  - neurology   - hold all AC   - rpt scan in 6 hours  - MRI at some point per neuro   - will update team with families goals of care    Case discussed with attending neurosurgeon.

## 2021-02-04 NOTE — H&P ADULT - ASSESSMENT
85y Male unknown PMH presents to the ED with right side weakness intubated in ER for decline in mental status and found to have large L frontotemporal hemorrhage with midline shift and intraventricular extension, as well as a left SDH. possibly due to hypertension vs. amyloid angiopathy.    #Neuro  large L frontotemporal hemorrhage w/ midline shift and SDH, suspect d/t HTn vs amyloid angiopathy  - s/p xarelto reversal with Andexanet amos, DDAVP, 1u pRBC  - Avoid any antiplatelet medications or therapeutic anticoagulation for now   - BP goal <160/90  - Repeat brain imaging in 48 hours to determine the candidacy for pharmacological DVT prophylaxis. SCDs for DVT prophylaxis in the interim   - MRI brain with and without contrast  - HbA1C and LDL  - keppra for seizure ppx  - neurology / NSGY recs appreciated; not a surgical candidate  - rpt scan in 6 hours    #CV  Shock state 2/2 sedation  -Levophed, Goal <160/90    #Pulm  -Intubated for airway protection, manage vent settings  -ABG prn    #GI  -TF when OGT in place    #Renal  -No active issues    #ID  -No active issues    #Heme  -On xarelto & ASA at home  -Holding all AC and anti-platelet for now s/p reversal and 1u platelets    #Endo  -no active issues    #DVT ppx  -SCDs    #GOC  -explained to grandsons (Marshall and Néstor) regarding the very poor prognosis of the bleed. Grandsons will hold family meeting regarding further GOC.    85y Male unknown PMH presents to the ED with right side weakness intubated in ER for decline in mental status and found to have large L frontotemporal hemorrhage with midline shift and intraventricular extension, as well as a left SDH. possibly due to hypertension vs. amyloid angiopathy.    #Neuro  large L frontotemporal hemorrhage w/ midline shift and SDH, suspect d/t HTn vs amyloid angiopathy  - s/p xarelto reversal with Andexanet amos, DDAVP, 1u pRBC  - Avoid any antiplatelet medications or therapeutic anticoagulation for now   - BP goal <160/90   - MRI brain/ Repeat CT head will not , not a surgical candidate by NSGY  - keppra for seizure ppx  - neurology / NSGY recs appreciated; not a surgical candidate    #CV  Shock state 2/2 sedation  -Levophed, Goal <140/90    #Pulm  -Intubated for airway protection, manage vent settings  -ABG prn    #GI  -TF when OGT in place    #Renal  -No active issues    #ID  -No active issues    #Heme  -On xarelto & ASA at home  -Holding all AC and anti-platelet for now s/p reversal and 1u platelets    #Endo  -no active issues    #DVT ppx  -SCDs    #GOC  -explained to grandsons (Marshall and Néstor) regarding the very poor prognosis of the bleed. Grandsons will hold family meeting regarding further GOC.

## 2021-02-04 NOTE — CONSULT NOTE ADULT - SUBJECTIVE AND OBJECTIVE BOX
NEUROSURGERY CONSULT    HPI: 85y Male unknown PMH presents to the ED with right side weakness. LKW 9 am 2/4/21. Patient was at home with his grandson. He was sitting upstairs, when all of a sudden the grandson heard a thud. Grandson went to find patient slumped over and on the floor, and not responsive. Patient was exercising earlier today, and was known to be normal at 9 am. No antiplatelets, no statins. BP in ED SBP 180s. Intubated in ER for decline in mental status and CT reveals large hemorrhagic stroke.     RADIOLOGY:   < from: CT Brain Stroke Protocol (02.04.21 @ 13:11) >    Abnormal area of high attenuation involving the left basal ganglia/corona radiata region is identified. This finding measures approximately 7.3 x 6.4 cm and does demonstrate surrounding edema. There is mass effect seen on the left lateral ventricle with left-to-right shift.    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 This finding measures approximately 1.1 cm in widest diameter.    Intraventricular hemorrhage is identified as well. Dilatation of the right lateral ventricle is identified.    Prominence of bifrontal extra space is seen which could be due to increase atrophy though the possibility of chronic subdural hematoma/hygroma cannot be entirely excluded. This finding measures approximately 0.9 cm widest diameter.    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    MEDS:  chlorhexidine 0.12% Liquid 15 milliLiter(s) Oral Mucosa every 12 hours  levETIRAcetam  IVPB 1000 milliGRAM(s) IV Intermittent once  propofol Infusion 20 MICROgram(s)/kG/Min IV Continuous <Continuous>      PHYSICAL EXAM:  Vital Signs Last 24 Hrs  HR: 75 (04 Feb 2021 13:23) (61 - 75)  BP: 169/134 (04 Feb 2021 13:23) (143/88 - 186/104)  RR: 24 (04 Feb 2021 13:23) (18 - 24)  SpO2: 100% (04 Feb 2021 13:23) (96% - 100%)    Examined by neurosurgery resident Bhanu Jimenes     Sedation held for exam  Awake, opens eyes spontaneously, reaching for tube with left arm, not following commands  left gaze preference, moderate right facial palsy, PERRL   Motor: some effort against gravity in the LUE, no movement RUE, some effort against gravity in b/l LE, localizing on the left                        NEUROSURGERY CONSULT    HPI: 85y Male unknown PMH presents to the ED with right side weakness. LKW 9 am 2/4/21. Patient was at home with his grandson. He was sitting upstairs, when all of a sudden the grandson heard a thud. Grandson went to find patient slumped over and on the floor, and not responsive. Patient was exercising earlier today, and was known to be normal at 9 am. No antiplatelets, no statins. BP in ED SBP 180s. Intubated in ER for decline in mental status and CT reveals large hemorrhagic stroke.     Spoke with family - he takes ASA 81mg, coreg 3.125mg, ometr      RADIOLOGY:   < from: CT Brain Stroke Protocol (02.04.21 @ 13:11) >    Abnormal area of high attenuation involving the left basal ganglia/corona radiata region is identified. This finding measures approximately 7.3 x 6.4 cm and does demonstrate surrounding edema. There is mass effect seen on the left lateral ventricle with left-to-right shift.    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 This finding measures approximately 1.1 cm in widest diameter.    Intraventricular hemorrhage is identified as well. Dilatation of the right lateral ventricle is identified.    Prominence of bifrontal extra space is seen which could be due to increase atrophy though the possibility of chronic subdural hematoma/hygroma cannot be entirely excluded. This finding measures approximately 0.9 cm widest diameter.    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    MEDS:  chlorhexidine 0.12% Liquid 15 milliLiter(s) Oral Mucosa every 12 hours  levETIRAcetam  IVPB 1000 milliGRAM(s) IV Intermittent once  propofol Infusion 20 MICROgram(s)/kG/Min IV Continuous <Continuous>      PHYSICAL EXAM:  Vital Signs Last 24 Hrs  HR: 75 (04 Feb 2021 13:23) (61 - 75)  BP: 169/134 (04 Feb 2021 13:23) (143/88 - 186/104)  RR: 24 (04 Feb 2021 13:23) (18 - 24)  SpO2: 100% (04 Feb 2021 13:23) (96% - 100%)    Examined by neurosurgery resident Bhanu Jimenes     Sedation held for exam  Awake, opens eyes spontaneously, reaching for tube with left arm, not following commands  left gaze preference, moderate right facial palsy, PERRL   Motor: some effort against gravity in the LUE, no movement RUE, some effort against gravity in b/l LE, localizing on the left                        NEUROSURGERY CONSULT    HPI: 85y Male unknown PMH presents to the ED with right side weakness. LKW 9 am 2/4/21. Patient was at home with his grandson. He was sitting upstairs, when all of a sudden the grandson heard a thud. Grandson went to find patient slumped over and on the floor, and not responsive. Patient was exercising earlier today, and was known to be normal at 9 am. No antiplatelets, no statins. BP in ED SBP 180s. Intubated in ER for decline in mental status and CT reveals large hemorrhagic stroke.     Spoke with family - he takes ASA 81mg, coreg 3.125mg, omeprazole, Xarelto 2.5mg      RADIOLOGY:   < from: CT Brain Stroke Protocol (02.04.21 @ 13:11) >    Abnormal area of high attenuation involving the left basal ganglia/corona radiata region is identified. This finding measures approximately 7.3 x 6.4 cm and does demonstrate surrounding edema. There is mass effect seen on the left lateral ventricle with left-to-right shift.    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 This finding measures approximately 1.1 cm in widest diameter.    Intraventricular hemorrhage is identified as well. Dilatation of the right lateral ventricle is identified.    Prominence of bifrontal extra space is seen which could be due to increase atrophy though the possibility of chronic subdural hematoma/hygroma cannot be entirely excluded. This finding measures approximately 0.9 cm widest diameter.    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    MEDS:  chlorhexidine 0.12% Liquid 15 milliLiter(s) Oral Mucosa every 12 hours  levETIRAcetam  IVPB 1000 milliGRAM(s) IV Intermittent once  propofol Infusion 20 MICROgram(s)/kG/Min IV Continuous <Continuous>      PHYSICAL EXAM:  Vital Signs Last 24 Hrs  HR: 75 (04 Feb 2021 13:23) (61 - 75)  BP: 169/134 (04 Feb 2021 13:23) (143/88 - 186/104)  RR: 24 (04 Feb 2021 13:23) (18 - 24)  SpO2: 100% (04 Feb 2021 13:23) (96% - 100%)    Examined by neurosurgery resident Bhanu Jimenes     Sedation held for exam  Awake, opens eyes spontaneously, reaching for tube with left arm, not following commands  left gaze preference, moderate right facial palsy, PERRL   Motor: some effort against gravity in the LUE, no movement RUE, some effort against gravity in b/l LE, localizing on the left                        NEUROSURGERY CONSULT    HPI: 85y Male unknown PMH presents to the ED with right side weakness. LKW 9 am 2/4/21. Patient was at home with his grandson. He was sitting upstairs, when all of a sudden the grandson heard a thud. Grandson went to find patient slumped over and on the floor, and not responsive. Patient was exercising earlier today, and was known to be normal at 9 am. BP in ED SBP 180s. Intubated in ER for decline in mental status and CT reveals large hemorrhagic stroke.     Spoke with family - he takes ASA 81mg, coreg 3.125mg, omeprazole, Xarelto 2.5mg. Family states that the patient was always afraid of the hospital and would not want to be here. They are going to discuss with the family their GOC.     RADIOLOGY:   < from: CT Brain Stroke Protocol (02.04.21 @ 13:11) >    Abnormal area of high attenuation involving the left basal ganglia/corona radiata region is identified. This finding measures approximately 7.3 x 6.4 cm and does demonstrate surrounding edema. There is mass effect seen on the left lateral ventricle with left-to-right shift.    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 This finding measures approximately 1.1 cm in widest diameter.    Intraventricular hemorrhage is identified as well. Dilatation of the right lateral ventricle is identified.    Prominence of bifrontal extra space is seen which could be due to increase atrophy though the possibility of chronic subdural hematoma/hygroma cannot be entirely excluded. This finding measures approximately 0.9 cm widest diameter.    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    MEDS:  chlorhexidine 0.12% Liquid 15 milliLiter(s) Oral Mucosa every 12 hours  levETIRAcetam  IVPB 1000 milliGRAM(s) IV Intermittent once  propofol Infusion 20 MICROgram(s)/kG/Min IV Continuous <Continuous>      PHYSICAL EXAM:  Vital Signs Last 24 Hrs  HR: 75 (04 Feb 2021 13:23) (61 - 75)  BP: 169/134 (04 Feb 2021 13:23) (143/88 - 186/104)  RR: 24 (04 Feb 2021 13:23) (18 - 24)  SpO2: 100% (04 Feb 2021 13:23) (96% - 100%)    Examined by neurosurgery resident Bhanu Jimenes     Sedation held for exam  Awake, opens eyes spontaneously, reaching for tube with left arm, not following commands  left gaze preference, moderate right facial palsy, PERRL   Motor: some effort against gravity in the LUE, no movement RUE, some effort against gravity in b/l LE, localizing on the left

## 2021-02-04 NOTE — CONSULT NOTE ADULT - ATTENDING COMMENTS
FELLOW:   84 yo man who presented to [facility] w/ unresponsiveness & right-sided weakness. Patient was reportedly sitting upstairs when the grandson suddenly heard the patient collapse to the ground. Code stroke initiated, NIHSS 25 MRS 0. Not a tPA candidate due to an IPH seen on CTH. Not a thrombectomy candidate either. Pertinent hx unknown however is on ASA at home as well as Xarelto. Reversal agents given in the ED. Patient now in the MICU.     CTH concerning for large L frontotemporal hemorrhage w/ midline shift & IVH as well as a left SDH,    Current exam remains unchanged.     Impression: Global Aphasia w/ right-sided hemiparesis 2/2 large L frontotemporal hemorrhage w/ midline shift & IVH of unknown etiology and mechanism. DDx includes HTN vs. CAA.     Plan:   *Dependent on goals of care discussion w/ family.  []MRI brain w/ and w/o contrast  []MRA H w/o contrast, MRA N w/ contrast  []tele  []TTE w/ bubble   Meds: [HOLD]ASA/Xarelto []statin   Labs: []A1c []LDL  Consults: []Neurosurgery []Palliative []PT/OT/SS as indicated   Recommendations   -strict BP control < 140/90   -avoid all blood thinners for now   -goals of care discussion w/ palliative and family advised  -c/w secondary stroke prevention w/ risk factor control   -serial CTH to monitor for progression of IPH  -monitor for signs of ICP, rest of the recommendations as per neurosurgery   -will likely need repeat imaging in 4-6 weeks outpatient   -Again***recommendations dependent on goals of care discussion w/ family, otherwise comfort measures. FELLOW:   86 yo man who presented to [facility] w/ unresponsiveness & right-sided weakness. Patient was reportedly sitting upstairs when the grandson suddenly heard the patient collapse to the ground. Code stroke initiated, NIHSS 25 MRS 0. Not a tPA candidate due to an IPH seen on CTH. Not a thrombectomy candidate either. Pertinent hx unknown however is on ASA at home as well as Xarelto. Reversal agents given in the ED. Patient now in the MICU.     CTH concerning for large L frontotemporal hemorrhage w/ midline shift & IVH as well as a left SDH,    Current exam remains unchanged.     Impression: Global Aphasia w/ right-sided hemiparesis 2/2 large L frontotemporal hemorrhage w/ midline shift & IVH of unknown etiology and mechanism. DDx includes HTN vs. CAA.     Plan:   *goals of care discussion w/ family.  [  Meds: [HOLD]ASA/Xarelto []statin   Labs: []A1c []LDL  Consults: []Neurosurgery []Palliative []PT/OT/SS as indicated   Recommendations   -strict BP control < 140/90   -avoid all blood thinners for now     VASCULAR NEUROLOGY ATTENDING :- [place]  Large left hemispheric intracerebral lobar hemorrhage with severe mass-effect.  Neurological exam–comatose, functional quadriplegia.  Chances of meaningful recovery is extremely low based on location age and extent of the hemorrhage.  I discussed the above case with medical ICU attending and stroke team.

## 2021-02-04 NOTE — H&P ADULT - HISTORY OF PRESENT ILLNESS
85y Male unknown PMH presents to the ED with right side weakness. LKW 9 am 2/4/21. Patient was at home with his grandson. He was sitting upstairs, when all of a sudden the grandson heard a thud. Grandson went to find patient slumped over and on the floor, and not responsive. Patient was exercising earlier today, and was known to be normal at 9 am. BP in ED SBP 180s. Intubated in ER for decline in mental status and CT reveals large hemorrhagic stroke.     Spoke with family - he takes ASA 81mg, coreg 3.125mg, omeprazole, Xarelto 2.5mg. Family states that the patient was always afraid of the hospital and would not want to be here. They are going to discuss with the family their GOC. Son does not know patient's medical history.

## 2021-02-04 NOTE — ED PROVIDER NOTE - PHYSICAL EXAMINATION
PGY3/MD Ashlie.  VITALS: reviewed  GEN: Obtunded, A & O x 0, [id number]  HEAD/EYES: NC/AT, pupil 3x3, responsive, fixed gaze to the left  ENT: mucus membranes dry, trachea midline, no JVD, neck is supple  RESP: lungs sounds bilaterally, chest wall atraumatic  CV: heart with tachycardia; distal pulses weak but symmetric bilaterally  ABDOMEN: soft, nondistended, nontender  MSK: extremities atraumatic, no edema, no asymmetry.  SKIN:  no rash, no bruising, no cyanosis.  NEURO: arousable, No left upper ext move, purposeful move.  PSYCH: not evaluatable

## 2021-02-04 NOTE — ED ADULT NURSE NOTE - OBJECTIVE STATEMENT
Edwar RN: pt received to trauma rm C as notification for r/o stroke. As per EMS, pt "slumped forward while eating breakfast and hit his head on the table." Family called EMS due to AMS. MD and edwar RN at bedside. 20G IV placed to b/l arms, labs drawn and sent. Pt intubated at bedside by MD, respiratory at bedside. Awaiting CXR and CT. Pt medicated as per orders. Report endorsed to primary RN. Edwar RN: pt received to trauma rm C as notification for r/o stroke. As per EMS, pt "slumped forward while eating breakfast and hit his head on the table." Family called EMS due to AMS. MD and float RN Jones at bedside. 20G IV placed to b/l arms, labs drawn and sent. Pt intubated at bedside by MD, respiratory at bedside. Awaiting CXR and CT. Pt medicated as per orders. Report endorsed to primary RN.

## 2021-02-04 NOTE — ED PROVIDER NOTE - ATTENDING CONTRIBUTION TO CARE
84 yo M who was BIBEMS for unresponsiveness after eating his breakfast. As per family, the pt is AAO x 3 and very active at baseline but slumped over at the table after eating breakfast at 9am and hit his head on the table. EMS says that he had a LUE and LLE weakness. The pt was initially only responsive to pain in the ED with a left sided gaze preference with a BP 190s; STROKE code was called; The pt. had an episode of labored breathing, vomited and was intubated with RSI for airway protection. The pt was taken to CT where a hemorrhagic stroke was seen. NS and neuro consult appreciated. Pt was admitted to MICU and family requested to come to be a the patient's bedside.   I performed a history and physical exam of the patient and discussed their management with the resident. I reviewed the resident's note and agree with the documented findings and plan of care. My medical decision making and observations are found above.

## 2021-02-04 NOTE — ED ADULT NURSE NOTE - NSIMPLEMENTINTERV_GEN_ALL_ED
Implemented All Fall with Harm Risk Interventions:  [place] to call system. Call bell, personal items and telephone within reach. Instruct patient to call for assistance. Room bathroom lighting operational. Non-slip footwear when patient is off stretcher. Physically safe environment: no spills, clutter or unnecessary equipment. Stretcher in lowest position, wheels locked, appropriate side rails in place. Provide visual cue, wrist band, yellow gown, etc. Monitor gait and stability. Monitor for mental status changes and reorient to person, place, and time. Review medications for side effects contributing to fall risk. Reinforce activity limits and safety measures with patient and family. Provide visual clues: red socks.

## 2021-02-04 NOTE — ED ADULT NURSE REASSESSMENT NOTE - NS ED NURSE REASSESS COMMENT FT1
As per MD Chance, titrate levo to systolic of 120-160. Pt reached titration goal at this time, in NAD, will continue to monitor.

## 2021-02-04 NOTE — ED PROVIDER NOTE - PROGRESS NOTE DETAILS
PGY3/MD Ashlie. No surgical indication, so no indication for transfer to [facility], needs admission to ICU level unit at [facility]. called ICU, pending dispo PGY3/MD Ashlie. stat head ct, left putamen hemorrage, rapture to subdural space, labetarol, sedation given, controoling bp < 160, NSGY called Pierre DIAZ: Patient pending admission to ICU, already seen. TBA to Dr. Cait Araujo. PGY3/MD Ashlie. No surgical indication, so no indication for transfer to [facility], needs admission to ICU level unit at [facility]. called ICU, pending dispo. Discussed the reverse agents for Xarelto and aspirin, with NSGY team, agreed to give the meds. Pharmacist is at the bedside, pt took xarelto this morning, full dose Andexxa is recommended. will give Andexxa, DDAVP, and a unit of platelet.

## 2021-02-04 NOTE — ED ADULT NURSE REASSESSMENT NOTE - NS ED NURSE REASSESS COMMENT FT1
As per MD trivedi, administer desmopressin first, then andexxa, and lastly platelets. Desmopressin infusing at this time. Awaiting andexxa from pharmacy, will continue to monitor.

## 2021-02-04 NOTE — ED ADULT NURSE NOTE - CHIEF COMPLAINT QUOTE
notification for r/o stroke, pt s/p syncope dotay as per EMS report, hit head on the floor with progressively worsening  change in mental status. roomed directly to Ryan FALLON, Code stroke called. last known well 9:00 am today 2/4/21

## 2021-02-04 NOTE — ED ADULT TRIAGE NOTE - CHIEF COMPLAINT QUOTE
pt notification sent back to amarilis FALLON notification for r/o stroke, pt s/p syncope dotay as per EMS report, hit head on the floor with progressively worsening  change in mental status. roomed directly to Ryan FALLON, Code stroke called. last known well 9:00 am today 2/4/21

## 2021-02-04 NOTE — ED PROVIDER NOTE - OBJECTIVE STATEMENT
PGY3/MD Ashlie. 84 yo M with no clear PMH, on Xarelto, asprin, thynthroid, opemprazol, BIBEMS, found on the floor this morning. Pt took breakfast with the family, went out to exercise as normal, last seen normal was 9a. Code stroke,    Grandson, Jimmie Tammy Rodriguez, [phone]  Marshall, [phone], Aristeo Rodriguez, [phone]

## 2021-02-04 NOTE — CONSULT NOTE ADULT - SUBJECTIVE AND OBJECTIVE BOX
MRN-[id number]  Patient is a 85y old  Male who presents with a chief complaint of     HPI: 86 yo M with no known PMH presents to the ED with right side weakness. LKW 9 am 2/4/21. Patient was at home with his grandson. He was sitting upstairs, when all of a sudden the grandson heard a thud. Grandson went to find patient slumped over and on the floor, and not responsive. Patient was exercising earlier today, and was known to be normal at 9 am. No antiplatelets, no statins.     NIHSS 25. MRS 0.      PAST MEDICAL & SURGICAL HISTORY:    FAMILY HISTORY:    Social Hx:  Nonsmoker, no drug or alcohol use    Home Medications:    MEDICATIONS  (STANDING):  labetalol Injectable 10 milliGRAM(s) IV Push once    MEDICATIONS  (PRN):    Allergies  No Known Allergies    Intolerances      REVIEW OF SYSTEMS    ROS: Pertinent positives in HPI, all other ROS were reviewed and are negative.      Vital Signs Last 24 Hrs  T(C): --  T(F): --  HR: --  BP: --  BP(mean): --  RR: --  SpO2: --    NEUROLOGICAL EXAM:  MS: awake, lethargic, does not follow any commands, no verbal output.  CN: no BTT b/l, left gaze preference, moderate right facial palsy  Motor: some effort against gravity in the LUE, no movement RUE, some effort against gravity in b/l LE  Sensory: absent in RUE, diminished grimace on RLE.        Radiology:  -CT Head  -MRI brain  -MRA brain/Carotids  -EEG  -EKG  -TTE/DANIE

## 2021-02-04 NOTE — H&P ADULT - ATTENDING COMMENTS
Patient here with ams due to acute intraparenchymal hemorrhage with midline shift, acute hypoxemic respiratory failure due to the same, requiring intubation.   Case d/w neurosurgery - they will NOT offer surgery to this patient given it is a fatal bleed. GOC discussion ongoing with family, may consider withdrawal  SBP goal <140  f/u abg    This patient is critically ill and required frequent bedside visits with therapy change. I have personally provided 30 minutes of critical care time concurrently with the resident/fellow. This time excludes time spent on separate procedures and time spent teaching. I have reviewed the resident/fellow’s documentation and I agree with the assessment and plan of care.

## 2021-02-04 NOTE — CONSULT NOTE ADULT - ASSESSMENT
84 yo M with no known PMH presents to the ED with right side weakness.    Impression:    Plan: 86 yo M with no known PMH presents to the ED with right side weakness, aphasia. Imaging reveals large L frontotemporal hemorrhage with midline shift and intraventricular extension, as well as a left SDH.    Impression: His exam reveals left gaze preference, R hemiparesis, and global aphasia consistent with left brain dysfunction. He has a large  L frontotemporal hemorrhage with midline shift and intraventricular extension, as well as a left SDH. Etiology of hemorrhage unknown at this time, but likely due to hypertension vs. amyloid angiopathy.    Plan:  - neurosurgery evaluation  - Avoid any antiplatelet medications or therapeutic anticoagulation for now   - BP goal <160/90  - Repeat brain imaging in 48 hours to determine the candidacy for pharmacological DVT prophylaxis. SCDs for DVT prophylaxis in the interim   - MRI brain with and without contrast   - CTA head and neck with and without contrast to evaluate for AVM  - HbA1C and LDL  - PT/OT/Speech and swallow evaluation when able

## 2021-02-04 NOTE — H&P ADULT - NSHPPHYSICALEXAM_GEN_ALL_CORE
Vital Signs Last 24 Hrs  T(C): 36 (04 Feb 2021 13:31), Max: 36 (04 Feb 2021 13:31)  T(F): 96.8 (04 Feb 2021 13:31), Max: 96.8 (04 Feb 2021 13:31)  HR: 75 (04 Feb 2021 15:40) (61 - 75)  BP: 96/57 (04 Feb 2021 15:40) (89/50 - 186/104)  BP(mean): 66 (04 Feb 2021 15:40) (59 - 123)  RR: 18 (04 Feb 2021 15:40) (14 - 24)  SpO2: 100% (04 Feb 2021 15:40) (96% - 100%)    PHYSICAL EXAM:  GENERAL: intubated  EYES: EOMI, PERRLA, conjunctiva and sclera clear  ENMT: No tonsillar erythema, exudates, or enlargement; Moist mucous membranes  NECK: Supple, No JVD, Normal thyroid  HEART: Regular rate and rhythm; No murmurs, rubs, or gallops  RESPIRATORY: CTA B/L, No W/R/R  ABDOMEN: Soft, Nontender, Nondistended; Bowel sounds present  NEUROLOGY: A&Ox3, nonfocal, moving all extremities  EXTREMITIES: No clubbing, cyanosis, or edema  SKIN: warm, dry, normal color, no rash or abnormal lesions Vital Signs Last 24 Hrs  T(C): 36 (04 Feb 2021 13:31), Max: 36 (04 Feb 2021 13:31)  T(F): 96.8 (04 Feb 2021 13:31), Max: 96.8 (04 Feb 2021 13:31)  HR: 75 (04 Feb 2021 15:40) (61 - 75)  BP: 96/57 (04 Feb 2021 15:40) (89/50 - 186/104)  BP(mean): 66 (04 Feb 2021 15:40) (59 - 123)  RR: 18 (04 Feb 2021 15:40) (14 - 24)  SpO2: 100% (04 Feb 2021 15:40) (96% - 100%)    PHYSICAL EXAM:  GENERAL: intubated  EYES: not reactive to light  NECK: Supple, No JVD, Normal thyroid  HEART: Regular rate and rhythm; No murmurs, rubs, or gallops  RESPIRATORY: CTA B/L, No W/R/R  ABDOMEN: Soft, Nontender, Nondistended; Bowel sounds present  EXTREMITIES: No clubbing, cyanosis, or edema

## 2021-02-05 NOTE — CHART NOTE - NSCHARTNOTEFT_GEN_A_CORE
HPI: 85y Male unknown PMH presents to the ED with right side weakness. LKW 9 am 2/4/21. Patient was at home with his grandson. He was sitting upstairs, when all of a sudden the grandson heard a thud. Grandson went to find patient slumped over and on the floor, and not responsive. Patient was exercising earlier today, and was known to be normal at 9 am. BP in ED SBP 180s. Intubated in ER for decline in mental status and CT reveals large hemorrhagic stroke.     Spoke with family - he takes ASA 81mg, coreg 3.125mg, omeprazole, Xarelto 2.5mg. Family states that the patient was always afraid of the hospital and would not want to be here. They are going to discuss with the family their GOC. Imaging and case reviewed with attending neurosurgeon.  No acute neurosurgical intervention at this time.  Care per primary team.  Please reconsult as needed.  At this time, this patient is not a neurosurgical candidate and per MICU, no more imaging is needed. Discussed case with attending.    < from: CT Brain Stroke Protocol (02.04.21 @ 13:11) >    Abnormal area of high attenuation involving the left basal ganglia/corona radiata region is identified. This finding measures approximately 7.3 x 6.4 cm and does demonstrate surrounding edema. There is mass effect seen on the left lateral ventricle with left-to-right shift.    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 This finding measures approximately 1.1 cm in widest diameter.    Intraventricular hemorrhage is identified as well. Dilatation of the right lateral ventricle is identified.    Prominence of bifrontal extra space is seen which could be due to increase atrophy though the possibility of chronic subdural hematoma/hygroma cannot be entirely excluded. This finding measures approximately 0.9 cm widest diameter.    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    Findings discussed with neurology resident on February 4, 2020 1:08 PM  with read back.

## 2021-02-05 NOTE — CONSULT NOTE ADULT - PROBLEM/RECOMMENDATION-2
[facility]    [address]    [facility] [address]    Phone:  [phone]       Thank You for choosing us for your health care visit. We are glad to serve you and happy to provide you with this summary of your visit. Please help us to ensure we have accurate records. If you find anything that needs to be changed, please let our staff know as soon as possible.          Your Demographic Information     Patient Name Sex     Columba Carrasco A Female 1937       Ethnic Group Patient Race    Not of  or  Origin White      Your Visit Details     Date & Time Provider Department    6/15/2017 3:00 PM Joseph Collazo MD [facility]      Your Upcoming Appointment*(Max 10)     2017  4:00 PM CDT   Lab Visit with VIRAL4 LAB   [facility])    [address]  [address]  [facility] [address] [phone]            2017  4:30 PM CDT   Nurse Visit with LUCILLE TEAM ROOM 3   [facility])    [address]  [address]  [facility] [address] [phone]            2017  9:15 AM CDT   Massage - 60 Min with Katy Hennessy LMT   [facility] ([facility])    [address]  [facility] [address]   [phone]            2017  1:00 PM CDT   Lab Visit with LUCILLE LAB   [facility])    [address]  [address]  [facility] [address] [phone]            2017  1:30 PM CDT   Nurse Visit with LUCILLE ONC RESOURCES   [facility])    [address]  [address]  [facility] [address]   [phone]            2017 10:00 AM CDT   Massage - 60 Min with iDane Schreiber LMT   [facility])    [address] [facility] [address]   [phone]            Saturday August 05, 2017 10:00 AM CDT   Massage - 60 Min with Diane Schreiber LMT   [facility] ([facility])    [address] [facility] [address]   [phone]            Tuesday August 08, 2017  1:00 PM CDT   Lab Visit with LUCILLE LAB   [facility])    [address]  [address]  [facility] [address] [phone]            Tuesday August 08, 2017  1:30 PM CDT   Nurse Visit with LUCILLE ONC RESOURCES   [facility])    [address]  [address]  [facility] [address] [phone]            Saturday August 19, 2017 10:00 AM CDT   Massage - 60 Min with Diane Schreiber LMT   [facility] ([facility])    [address]  [place] [address]   [phone]              Your Vitals Were     BP Pulse Weight SpO2 BMI Smoking Status    104/56 80 158 lb (71.7 kg) 96% 26.7 kg/m2 Former Smoker      Medications Prescribed or Re-Ordered Today     None      Your Current Medications Are        Disp Refills Start End    chlorhexidine gluconate (PERIDEX) 0.12 % solution 473 mL 6 6/5/2017 6/5/2018    Sig - Route: Rinse with 1/2 ounce (1 capful) 2 times daily for 30 seconds then expectorate, do not swallow. - Swish & Spit    sodium fluoride (PREVIDENT) 0.2 % solution 473 mL 6 2/14/2017 2/14/2018    Sig: SWISH DAILY FOR 1 MINUTE THEN EXPECTORATE (NO FOOD OR DRINK FOR AT LEAST 30 MINUTES)    triamcinolone (KENALOG) 0.1 % dental paste 5 g 12 5/10/2017     Sig - Route: Take by mouth 3 times daily. - Mouth/Throat    Class: Eprescribe    omeprazole (PRILOSEC) 40 MG capsule 90 capsule 3 4/24/2017     Sig - Route: Take 1 capsule by mouth daily. -  Oral    Class: Eprescribe    HYDROcodone-acetaminophen (NORCO) 5-325 MG per tablet 40 tablet 0 4/17/2017     Sig - Route: Take 1-2 tablets by mouth every 6 hours as needed for Pain. - Oral    ibuprofen (MOTRIN) 800 MG tablet        Sig - Route: Take 800 mg by mouth every 6 hours as needed for Pain. - Oral    Class: Historical Med    zolpidem (AMBIEN) 10 MG tablet        Sig: Indications: Trouble Sleeping Take 1 tablet by mouth at bedtime as needed for sleep    Class: Historical Med    calcium citrate-vitamin D (CITRACAL+D) 315-250 MG-UNIT per tablet        Sig - Route: Take 1 tablet by mouth 3 times daily. - Oral    Class: Historical Med    NIFEdipine (NIFEDICAL XL) 60 MG 24 hr tablet 90 tablet 1 2/20/2017 4/15/2018    Sig: Take one tablet by mouth daily.    Class: Eprescribe    citalopram (CELEXA) 20 MG tablet 90 tablet 1 1/30/2017 7/31/2017    Sig - Route: Take 1 tablet by mouth daily. - Oral    Class: Eprescribe    pregabalin (LYRICA) 75 MG capsule 90 capsule 1 1/30/2017     Sig - Route: Take 1 capsule by mouth daily. - Oral    Class: Script Not Printed    DIAZepam (VALIUM) 10 MG tablet 40 tablet 1 7/11/2016     Sig - Route: Take 1 tablet by mouth every 6 hours as needed for Anxiety. - Oral    Class: Script Not Printed    epoetin deon (PROCRIT) 01309 UNIT/ML injection 1 mL  8/19/2013     Sig - Route: Inject 10,000 Units into the skin every 21 days. - Subcutaneous    Class: Historical Med    lactase (LACTAID) 3000 UNIT tablet        Sig - Route: Take 1 tablet by mouth 3 times daily (with meals). Pt takes when needed. - Oral    Class: Historical Med    ranitidine (ZANTAC) 150 MG tablet 90 tablet 1 11/11/2016 6/5/2017    Sig: Take one tablet by mouth nightly.    Class: Eprescribe      Allergies     Codeine GI UPSET      Immunizations History as of 6/15/2017     Name Date    HERPES ZOSTER SHINGLES 11/5/2011    INFLUENZA QUADRIVALENT 10/1/2014    Influenza 9/24/2015, 9/28/2012, 10/1/2010    Influenza High Dose Pres  Free 9/24/2016    Influenza Quadrivalent Live 10/10/2013    Pneumococcal Conjugate 13 Valent 9/24/2015    Pneumococcal Polysaccharide Adult 11/21/2016  1:30 PM    Tdap 9/9/2014, 8/17/2013, 8/17/2013      Problem List as of 6/15/2017     Essential hypertension, benign    Esophageal reflux    Malignant neoplasm of breast (female), unspecified site    Nonallopathic lesion of thoracic region, not elsewhere classified    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

## 2021-02-05 NOTE — CONSULT NOTE ADULT - ASSESSMENT
86 yo male with h/o HTN admitted with acute mental status change found to have hemorrhagic cva.  Asked to see for goc

## 2021-02-05 NOTE — PROGRESS NOTE ADULT - SUBJECTIVE AND OBJECTIVE BOX
INTERVAL HPI/OVERNIGHT EVENTS:    SUBJECTIVE: Patient seen and examined at bedside. Unable to obtain.    OBJECTIVE:  VITAL SIGNS:  ICU Vital Signs Last 24 Hrs  T(C): 36 (05 Feb 2021 03:48), Max: 36 (04 Feb 2021 13:31)  T(F): 96.8 (05 Feb 2021 03:48), Max: 96.8 (04 Feb 2021 13:31)  HR: 81 (05 Feb 2021 03:48) (61 - 91)  BP: 92/66 (05 Feb 2021 03:48) (89/50 - 186/104)  BP(mean): 96 (04 Feb 2021 20:48) (59 - 123)  RR: 18 (05 Feb 2021 03:48) (13 - 24)  SpO2: 99% (05 Feb 2021 03:48) (96% - 100%)    Mode: AC/ CMV (Assist Control/ Continuous Mandatory Ventilation), RR (machine): 18, TV (machine): 450, FiO2: 40, PEEP: 5, ITime: 0.84, MAP: 9, PIP: 21    02-04 @ 07:01  -  02-05 @ 07:00  --------------------------------------------------------  IN: 856.5 mL / OUT: 510 mL / NET: 346.5 mL    CAPILLARY BLOOD GLUCOSE  POCT Blood Glucose.: 120 mg/dL (05 Feb 2021 00:21)    PHYSICAL EXAM:  General: NAD, sedated intubated  HEENT: NC/AT; PERRL, clear conjunctiva  Respiratory: CTA b/l  Cardiovascular: +S1/S2; RRR  Abdomen: soft, NT/ND; +BS x4  Extremities: WWP, 2+ peripheral pulses b/l; no LE edema  Skin: normal color and turgor; no rash  Neurological: unabel to assess    MEDICATIONS:  MEDICATIONS  (STANDING):  chlorhexidine 0.12% Liquid 15 milliLiter(s) Oral Mucosa every 12 hours  chlorhexidine 4% Liquid 1 Application(s) Topical <User Schedule>  fentaNYL   Infusion. 0.5 MICROgram(s)/kG/Hr (4 mL/Hr) IV Continuous <Continuous>  levETIRAcetam  IVPB 500 milliGRAM(s) IV Intermittent every 12 hours  norepinephrine Infusion 0.05 MICROgram(s)/kG/Min (7.5 mL/Hr) IV Continuous <Continuous>  propofol Infusion 20 MICROgram(s)/kG/Min (9.6 mL/Hr) IV Continuous <Continuous>    ALLERGIES:  Allergies  No Known Allergies  Intolerances    LABS:    ABG - ( 04 Feb 2021 23:45 )  pH, Arterial: 7.41  pH, Blood: x     /  pCO2: 38    /  pO2: 164   / HCO3: 24    / Base Excess: -0.8  /  SaO2: 99.3                            12.3   16.00 )-----------( 173      ( 05 Feb 2021 05:35 )             39.0     02-05    142  |  104  |  19  ----------------------------<  108<H>  3.7   |  25  |  1.26    Ca    8.7      05 Feb 2021 02:09  Phos  3.6     02-05  Mg     1.9     02-05    TPro  6.6  /  Alb  3.7  /  TBili  0.6  /  DBili  x   /  AST  23  /  ALT  22  /  AlkPhos  77  02-04    PT/INR - ( 04 Feb 2021 13:25 )   PT: 14.7 sec;   INR: 1.31 ratio    PTT - ( 05 Feb 2021 05:35 )  PTT:32.2 sec    RADIOLOGY & ADDITIONAL TESTS: Reviewed.    < from: CT Brain Stroke Protocol (02.04.21 @ 13:11) >  INTERPRETATION:  Clinical indication: Code stroke.  Multiple axial sections were performed from base of skull to vertex without contrast enhancement.  Abnormal area of high attenuation involving the left basal ganglia/corona radiata region is identified. This finding measures approximately 7.3 x 6.4 cm and does demonstrate surrounding edema. There is mass effect seen on the left lateral ventricle with left-to-right shift.  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 This finding measures approximately 1.1 cm in widest diameter.  Intraventricular hemorrhage is identified as well. Dilatation of the right lateral ventricle is identified.  Prominence of bifrontal extra space is seen which could be due to increase atrophy though the possibility of chronic subdural hematoma/hygroma cannot be entirely excluded. This finding measures approximately 0.9 cm widest diameter.    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  Findings discussed with neurology resident on February 4, 2020 1:08 PM  with read back.      < end of copied text >   INTERVAL HPI/OVERNIGHT EVENTS: FITO. FiO2 was reduced to 50%.    SUBJECTIVE: Patient seen and examined at bedside. Unable to obtain ROS.     OBJECTIVE:  VITAL SIGNS:  ICU Vital Signs Last 24 Hrs  T(C): 36 (05 Feb 2021 03:48), Max: 36 (04 Feb 2021 13:31)  T(F): 96.8 (05 Feb 2021 03:48), Max: 96.8 (04 Feb 2021 13:31)  HR: 81 (05 Feb 2021 03:48) (61 - 91)  BP: 92/66 (05 Feb 2021 03:48) (89/50 - 186/104)  BP(mean): 96 (04 Feb 2021 20:48) (59 - 123)  RR: 18 (05 Feb 2021 03:48) (13 - 24)  SpO2: 99% (05 Feb 2021 03:48) (96% - 100%)    Mode: AC/ CMV (Assist Control/ Continuous Mandatory Ventilation), RR (machine): 18, TV (machine): 450, FiO2: 40, PEEP: 5, ITime: 0.84, MAP: 9, PIP: 21    02-04 @ 07:01  -  02-05 @ 07:00  --------------------------------------------------------  IN: 856.5 mL / OUT: 510 mL / NET: 346.5 mL    CAPILLARY BLOOD GLUCOSE  POCT Blood Glucose.: 120 mg/dL (05 Feb 2021 00:21)    PHYSICAL EXAM:  General: NAD, sedated intubated  HEENT: NC/AT; PERRL, clear conjunctiva  Respiratory: CTA b/l  Cardiovascular: +S1/S2; RRR  Abdomen: soft, NT/ND; +BS x4  Extremities: WWP, 2+ peripheral pulses b/l; no LE edema  Skin: normal color and turgor; no rash  Neurological: unabel to assess    MEDICATIONS:  MEDICATIONS  (STANDING):  chlorhexidine 0.12% Liquid 15 milliLiter(s) Oral Mucosa every 12 hours  chlorhexidine 4% Liquid 1 Application(s) Topical <User Schedule>  fentaNYL   Infusion. 0.5 MICROgram(s)/kG/Hr (4 mL/Hr) IV Continuous <Continuous>  levETIRAcetam  IVPB 500 milliGRAM(s) IV Intermittent every 12 hours  norepinephrine Infusion 0.05 MICROgram(s)/kG/Min (7.5 mL/Hr) IV Continuous <Continuous>  propofol Infusion 20 MICROgram(s)/kG/Min (9.6 mL/Hr) IV Continuous <Continuous>    ALLERGIES:  Allergies  No Known Allergies  Intolerances    LABS:    ABG - ( 04 Feb 2021 23:45 )  pH, Arterial: 7.41  pH, Blood: x     /  pCO2: 38    /  pO2: 164   / HCO3: 24    / Base Excess: -0.8  /  SaO2: 99.3                            12.3   16.00 )-----------( 173      ( 05 Feb 2021 05:35 )             39.0     02-05    142  |  104  |  19  ----------------------------<  108<H>  3.7   |  25  |  1.26    Ca    8.7      05 Feb 2021 02:09  Phos  3.6     02-05  Mg     1.9     02-05    TPro  6.6  /  Alb  3.7  /  TBili  0.6  /  DBili  x   /  AST  23  /  ALT  22  /  AlkPhos  77  02-04    PT/INR - ( 04 Feb 2021 13:25 )   PT: 14.7 sec;   INR: 1.31 ratio    PTT - ( 05 Feb 2021 05:35 )  PTT:32.2 sec    RADIOLOGY & ADDITIONAL TESTS: Reviewed.    < from: CT Brain Stroke Protocol (02.04.21 @ 13:11) >  INTERPRETATION:  Clinical indication: Code stroke.  Multiple axial sections were performed from base of skull to vertex without contrast enhancement.  Abnormal area of high attenuation involving the left basal ganglia/corona radiata region is identified. This finding measures approximately 7.3 x 6.4 cm and does demonstrate surrounding edema. There is mass effect seen on the left lateral ventricle with left-to-right shift.  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 This finding measures approximately 1.1 cm in widest diameter.  Intraventricular hemorrhage is identified as well. Dilatation of the right lateral ventricle is identified.  Prominence of bifrontal extra space is seen which could be due to increase atrophy though the possibility of chronic subdural hematoma/hygroma cannot be entirely excluded. This finding measures approximately 0.9 cm widest diameter.    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  Findings discussed with neurology resident on February 4, 2020 1:08 PM  with read back.      < end of copied text >

## 2021-02-05 NOTE — CONSULT NOTE ADULT - PROBLEM SELECTOR RECOMMENDATION 3
spoke with both grandsons   they are aware that pt had significant cva and would never be the same function as previous  explained the severe nature of cva and that even if he stabilizes he most likely will never be able to breath on his own  family would not want him in snf but would want to care for him at home  we discuss options of withdrawal and not reintubating.  they agreed to speak with family and discuss options  overall prognosis is grave  would not escalate care   concern for worsening bleed, herniation  please call [phone] over weekend with any questions  will follow up on monday

## 2021-02-05 NOTE — CONSULT NOTE ADULT - SUBJECTIVE AND OBJECTIVE BOX
HPI:  85y Male unknown PMH presents to the ED with right side weakness. LKW 9 am 2/4/21. Patient was at home with his grandson. He was sitting upstairs, when all of a sudden the grandson heard a thud. Grandson went to find patient slumped over and on the floor, and not responsive. Patient was exercising earlier today, and was known to be normal at 9 am. BP in ED SBP 180s. Intubated in ER for decline in mental status and CT reveals large hemorrhagic stroke.     Spoke with family - he takes ASA 81mg, coreg 3.125mg, omeprazole, Xarelto 2.5mg. Family states that the patient was always afraid of the hospital and would not want to be here. They are going to discuss with the family their GOC. Son does not know patient's medical history.     Pt seen intubated in MICU.  No sedation.  Pt hypotensive.  Appears comfortable otherwise     (04 Feb 2021 15:45)    PERTINENT PM/SXH:   HTN      No significant past surgical history      FAMILY HISTORY:  No pertinent family history in first degree relatives  NO history of cva    ITEMS NOT CHECKED ARE NOT PRESENT    SOCIAL HISTORY:   Significant other/partner:  [x ]  Children:  [x ]  Faith/Spirituality:  Substance hx:  [ ]   Tobacco hx:  [ ]   Alcohol hx: [ ]   Home Opioid hx:  [ ] I-Stop Reference No:  Living Situation: [ x]Home  [ ]Long term care  [ ]Rehab [ ]Other    ADVANCE DIRECTIVES:    DNR  MOLST  [ ]  Living Will  [ ]   DECISION MAKER(s):  [ ] Health Care Proxy(s)  [ ] Surrogate(s)  [ ] Guardian           Name(s): Phone Number(s):  wife defers to grandchildren - racheal stovall    BASELINE (I)ADL(s) (prior to admission):  [place]: [x ]Total  [ ] Moderate [ ]Dependent    Allergies    No Known Allergies    Intolerances    MEDICATIONS  (STANDING):  chlorhexidine 0.12% Liquid 15 milliLiter(s) Oral Mucosa every 12 hours  chlorhexidine 4% Liquid 1 Application(s) Topical <User Schedule>  fentaNYL   Infusion. 0.5 MICROgram(s)/kG/Hr (4 mL/Hr) IV Continuous <Continuous>  levETIRAcetam  IVPB 500 milliGRAM(s) IV Intermittent every 12 hours  norepinephrine Infusion 0.05 MICROgram(s)/kG/Min (7.5 mL/Hr) IV Continuous <Continuous>  propofol Infusion 20 MICROgram(s)/kG/Min (9.6 mL/Hr) IV Continuous <Continuous>    MEDICATIONS  (PRN):    PRESENT SYMPTOMS: [x ]Unable to obtain due to poor mentation   Source if other than patient:  [ ]Family   [ ]Team     Pain (Impact on QOL):    Location -         Minimal acceptable level (0-10 scale):                    Aggrevating factors -  Quality -  Radiation -  Severity (0-10 scale) -    Timing -    PAIN AD Score:     [email or web address] (press ctrl +  left click to view)    Dyspnea:                           [ ]Mild [ ]Moderate [ ]Severe  Anxiety:                             [ ]Mild [ ]Moderate [ ]Severe  Fatigue:                             [ ]Mild [ ]Moderate [ ]Severe  Nausea:                             [ ]Mild [ ]Moderate [ ]Severe  Loss of appetite:              [ ]Mild [ ]Moderate [ ]Severe  Constipation:                    [ ]Mild [ ]Moderate [ ]Severe    Other Symptoms:  [ ]All other review of systems negative     Karnofsky Performance Score/Palliative Performance Status Version 2:      10   %  PHYSICAL EXAM:  Vital Signs Last 24 Hrs  T(C): 36 (05 Feb 2021 03:48), Max: 36 (05 Feb 2021 03:48)  T(F): 96.8 (05 Feb 2021 03:48), Max: 96.8 (05 Feb 2021 03:48)  HR: 93 (05 Feb 2021 11:20) (63 - 93)  BP: 97/64 (05 Feb 2021 11:00) (87/89 - 155/86)  BP(mean): 75 (05 Feb 2021 11:00) (59 - 107)  RR: 18 (05 Feb 2021 11:00) (13 - 20)  SpO2: 97% (05 Feb 2021 11:20) (95% - 100%) I&O's Summary    04 Feb 2021 07:01  -  05 Feb 2021 07:00  --------------------------------------------------------  IN: 856.5 mL / OUT: 510 mL / NET: 346.5 mL    05 Feb 2021 07:01  -  05 Feb 2021 15:02  --------------------------------------------------------  IN: 327.9 mL / OUT: 95 mL / NET: 232.9 mL    GENERAL:  [ ]Alert  [ ]Oriented x   [ ]Lethargic  [ ]Cachexia  [ ]Unarousable  [ ]Verbal  [x ]Non-Verbal  Behavioral:   [ ] Anxiety  [ ] Delirium [ ] Agitation [ ] Other  HEENT:  [ ]Normal   [ ]Dry mouth   [ x]ET Tube/Trach  [ ]Oral lesions  PULMONARY:   [ x]Clear [ ]Tachypnea  [ ]Audible excessive secretions   [ ]Rhonchi        [ ]Right [ ]Left [ ]Bilateral  [ ]Crackles        [ ]Right [ ]Left [ ]Bilateral  [ ]Wheezing     [ ]Right [ ]Left [ ]Bilateral  CARDIOVASCULAR:    [ ]Regular [ ]Irregular [ x]Tachy  [ ]Barry [ ]Murmur [ ]Other  GASTROINTESTINAL:  [x ]Soft  [ ]Distended   [ x]+BS  [x ]Non tender [ ]Tender  [ ]PEG [ x]OGT/ NGT  Last BM:   GENITOURINARY:  [ ]Normal [ ] Incontinent   [ ]Oliguria/Anuria   [ x]Jesus  MUSCULOSKELETAL:   [ ]Normal   [ ]Weakness  [ x]Bed/Wheelchair bound [ ]Edema  NEUROLOGIC:   [ ]No focal deficits  [ ] Cognitive impairment  [ ] Dysphagia [ ]Dysarthria [x ] Paresis [ ]Other   SKIN:   [ x]Normal   [ ]Pressure ulcer(s)  [ ]Rash    CRITICAL CARE:  [ ] Shock Present  [ ]Septic [ ]Cardiogenic [ ]Neurologic [ ]Hypovolemic  [ ]  Vasopressors [ ]  Inotropes   [ ] Respiratory failure present  [ ] Acute  [ ] Chronic [ ] Hypoxic  [ ] Hypercarbic [ ] Other  [ ] Other organ failure     LABS:                        12.3   16.00 )-----------( 173      ( 05 Feb 2021 05:35 )             39.0   02-05    142  |  104  |  19  ----------------------------<  108<H>  3.7   |  25  |  1.26    Ca    8.7      05 Feb 2021 02:09  Phos  3.6     02-05  Mg     1.9     02-05    TPro  6.6  /  Alb  3.7  /  TBili  0.6  /  DBili  x   /  AST  23  /  ALT  22  /  AlkPhos  77  02-04  PT/INR - ( 04 Feb 2021 13:25 )   PT: 14.7 sec;   INR: 1.31 ratio         PTT - ( 05 Feb 2021 05:35 )  PTT:32.2 sec      RADIOLOGY & ADDITIONAL STUDIES:    PROTEIN CALORIE MALNUTRITION:   [ ] PPSV2 < or = to 30% [ ] significant weight loss  [ ] poor nutritional intake [ ] catabolic state [ ] anasarca     Albumin, Serum: 3.7 g/dL (02-04-21 @ 13:25)  Artificial Nutrition [ ]     REFERRALS:   [ ]Chaplaincy  [ ] Hospice  [ ]Child Life  [ ]Social Work  [ ]Case management [ ]Holistic Therapy   Goals of Care Discussion Document:

## 2021-02-05 NOTE — PROGRESS NOTE ADULT - ASSESSMENT
85y Male unknown PMH presents to the ED with right side weakness intubated in ER for decline in mental status and found to have large L frontotemporal hemorrhage with midline shift and intraventricular extension, as well as a left SDH. possibly due to hypertension vs. amyloid angiopathy.    #Neuro  large L frontotemporal hemorrhage w/ midline shift and SDH, suspect d/t HTn vs amyloid angiopathy  - s/p xarelto reversal with Andexanet amos, DDAVP, 1u pRBC  - Avoid any antiplatelet medications or therapeutic anticoagulation for now   - BP goal <160/90   - MRI brain/ Repeat CT head will not , not a surgical candidate by NSGY  - keppra for seizure ppx  - neurology / NSGY recs appreciated; not a surgical candidate    #CV  Shock state 2/2 sedation  -Levophed, Goal <140/90    #Pulm  -Intubated for airway protection, manage vent settings  -ABG prn    #GI  -TF when OGT in place    #Renal  -No active issues    #ID  -No active issues    #Heme  -On xarelto & ASA at home  -Holding all AC and anti-platelet for now s/p reversal and 1u platelets    #Endo  -no active issues    #DVT ppx  -SCDs    #GOC  -explained to grandsons (Marshall and Néstor) regarding the very poor prognosis of the bleed. Grandsons will hold family meeting regarding further GOC.    85y Male unknown PMH presents to the ED with right side weakness intubated in ER for decline in mental status and found to have large L frontotemporal hemorrhage with midline shift and intraventricular extension, as well as a left SDH. possibly due to hypertension vs. amyloid angiopathy.    #Neuro  large L frontotemporal hemorrhage w/ midline shift and SDH, suspect d/t HTn vs amyloid angiopathy  - s/p xarelto reversal with Andexanet amos, DDAVP, 1u pRBC  - holding AP and AC   - BP goal <160/90   - MRI brain/ Repeat CT head will not , not a surgical candidate by BANDAR  - keppra for seizure ppx  - neurology / NSGY recs appreciated; not a surgical candidate    #CV  Shock state 2/2 sedation  -c/w Levophed, Goal <140/90, wean as possible    #Pulm  -Intubated for airway protection, manage vent settings  -ABG checked, wnl.     #GI  -OGT in place  -Will start TF    #Renal  -No active issues    #ID  -No active issues    #Heme  -On xarelto & ASA at home  -Holding all AC and anti-platelet for now s/p reversal and 1u platelets    #Endo  -no active issues    #DVT ppx  -SCDs    #GOC  -explained to grandsons (Marshall and Palm) regarding the very poor prognosis of the bleed. Grandsons will hold family meeting regarding further GOC.   2/4- Spoke with grandson, communicated poor prognosis, as of now pt remains full code and son wants everything done. He will discuss with family. Palliative consulted.

## 2021-02-06 NOTE — PROGRESS NOTE ADULT - ASSESSMENT
85y Male unknown PMH presents to the ED with right side weakness intubated in ER for decline in mental status and found to have large L frontotemporal hemorrhage with midline shift and intraventricular extension, as well as a left SDH. possibly due to hypertension vs. amyloid angiopathy.    #Neuro  large L frontotemporal hemorrhage w/ midline shift and SDH, suspect d/t HTn vs amyloid angiopathy  - s/p xarelto reversal with Andexanet amos, DDAVP, 1u pRBC  - holding AP and AC   - BP goal <160/90   - MRI brain/ Repeat CT head will not , not a surgical candidate by BANDAR  - keppra for seizure ppx  - neurology / NSGY recs appreciated; not a surgical candidate    #CV  Shock state 2/2 sedation  -c/w Levophed, Goal <140/90, wean as possible, on minimal dose currently    #Pulm  -Intubated for airway protection, manage vent settings  -ABG checked, wnl.     #GI  -OGT in place  -Will start TF    #Renal  - Cr 1.6 -> 2.1 with poor UO    #ID  - WC jump from 16.6 -> 22, started on Vanc/Zosyn    #Heme  -On xarelto & ASA at home  -Holding all AC and anti-platelet for now s/p reversal and 1u platelets    #Endo  -no active issues    #DVT ppx  -SCDs    #GOC  -explained to grandsons (Marshall and Néstor) regarding the very poor prognosis of the bleed. Grandsons will hold family meeting regarding further GOC.   2/4- Spoke with grandson, communicated poor prognosis, as of now pt remains full code and son wants everything done. He will discuss with family. Palliative consulted.    85y Male unknown PMH presents to the ED with right side weakness intubated in ER for decline in mental status and found to have large L frontotemporal hemorrhage with midline shift and intraventricular extension, as well as a left SDH. possibly due to hypertension vs. amyloid angiopathy.    #Neuro  large L frontotemporal hemorrhage w/ midline shift and SDH, suspect d/t HTn vs amyloid angiopathy  - s/p xarelto reversal with Andexanet amos, DDAVP, 1u pRBC  - holding AP and AC   - BP goal <160/90   - MRI brain/ Repeat CT head will not , not a surgical candidate by BANDAR  - keppra for seizure ppx  - neurology / NSGY recs appreciated; not a surgical candidate  - sedated on propofol and fentanyl    #CV  Shock state 2/2 sedation  -c/w Levophed, Goal <140/90, wean as possible, on minimal dose currently    #Pulm  -Intubated for airway protection, manage vent settings  -ABG checked, wnl.     #GI  -OGT in place  - TF held due to residuals and coffee ground emesis, OG tube to suction  - PPI    #Renal  - Cr 1.6 -> 2.1 with poor UO    #ID  - WC jump from 16.6 -> 22, started on Vanc/Zosyn; Bcx drawn    #Heme  -On xarelto & ASA at home  -Holding all AC and anti-platelet for now s/p reversal and 1u platelets    #Endo  -no active issues    #DVT ppx  -SCDs    #GOC  -explained to grandsons (Marshall and Néstor) regarding the very poor prognosis of the bleed. Grandsons will hold family meeting regarding further GOC.   2/4- Spoke with grandson, communicated poor prognosis, as of now pt remains full code and son wants everything done. He will discuss with family. Palliative consulted.

## 2021-02-06 NOTE — PROGRESS NOTE ADULT - SUBJECTIVE AND OBJECTIVE BOX
CHIEF COMPLAINT: Patient is a 85y old  Male who presents with a chief complaint of Hemorrhagic stroke (05 Feb 2021 15:01)    Interval Events: Febrile to 100.9,     REVIEW OF SYSTEMS:  Constitutional:     [ ] negative [ ] fevers [ ] chills [ ] weight loss [ ] weight gain  HEENT:                  [ ] negative [ ] dry eyes [ ] eye irritation [ ] postnasal drip [ ] nasal congestion  CV:                         [ ] negative  [ ] chest pain [ ] orthopnea [ ] palpitations [ ] murmur  Resp:                     [ ] negative [ ] cough [ ] shortness of breath [ ] dyspnea [ ] wheezing [ ] sputum [ ] hemoptysis  GI:                          [ ] negative [ ] nausea [ ] vomiting [ ] diarrhea [ ] constipation [ ] abd pain [ ] dysphagia   :                        [ ] negative [ ] dysuria [ ] nocturia [ ] hematuria [ ] increased urinary frequency  Musculoskeletal: [ ] negative [ ] back pain [ ] myalgias [ ] arthralgias [ ] fracture  Skin:                       [ ] negative [ ] rash [ ] itch  Neurological:        [ ] negative [ ] headache [ ] dizziness [ ] syncope [ ] weakness [ ] numbness  Psychiatric:           [ ] negative [ ] anxiety [ ] depression  Endocrine:            [ ] negative [ ] diabetes [ ] thyroid problem  Heme/Lymph:      [ ] negative [ ] anemia [ ] bleeding problem  Allergic/Immune: [ ] negative [ ] itchy eyes [ ] nasal discharge [ ] hives [ ] angioedema    [ ] All other systems negative  [x] Unable to assess ROS because pt intubated and sedated.    OBJECTIVE:  ICU Vital Signs Last 24 Hrs  T(C): 37.9 (06 Feb 2021 03:05), Max: 38.3 (05 Feb 2021 23:14)  T(F): 100.2 (06 Feb 2021 03:05), Max: 100.9 (05 Feb 2021 23:14)  HR: 94 (06 Feb 2021 06:34) (82 - 97)  BP: 98/60 (06 Feb 2021 03:05) (87/89 - 110/65)  BP(mean): 71 (05 Feb 2021 16:00) (69 - 75)  ABP: --  ABP(mean): --  RR: 18 (06 Feb 2021 03:05) (18 - 18)  SpO2: 96% (06 Feb 2021 06:34) (95% - 99%)    Mode: AC/ CMV (Assist Control/ Continuous Mandatory Ventilation), RR (machine): 18, TV (machine): 450, FiO2: 40, PEEP: 5, ITime: 0.72, MAP: 8, PIP: 17    02-05 @ 07:01  -  02-06 @ 07:00  --------------------------------------------------------  IN: 2048.4 mL / OUT: 600 mL / NET: 1448.4 mL      CAPILLARY BLOOD GLUCOSE      POCT Blood Glucose.: 101 mg/dL (05 Feb 2021 22:26)      PHYSICAL EXAM:  General: WN/WD NAD  HEENT: PERRLA, EOMI, moist mucous membranes  Neurology: A&Ox3, nonfocal, COOK x 4  Respiratory: CTA B/L, normal respiratory effort, no wheezes, crackles, rales  CV: RRR, S1S2, no murmurs, rubs or gallops  Abdominal: Soft, NT, ND +BS, Last BM  Extremities: No edema, + peripheral pulses  Incisions:   Tubes:    HOSPITAL MEDICATIONS:  MEDICATIONS  (STANDING):  chlorhexidine 0.12% Liquid 15 milliLiter(s) Oral Mucosa every 12 hours  chlorhexidine 4% Liquid 1 Application(s) Topical <User Schedule>  fentaNYL   Infusion. 0.5 MICROgram(s)/kG/Hr (4 mL/Hr) IV Continuous <Continuous>  levETIRAcetam  IVPB 500 milliGRAM(s) IV Intermittent every 12 hours  norepinephrine Infusion 0.05 MICROgram(s)/kG/Min (7.5 mL/Hr) IV Continuous <Continuous>  pantoprazole  Injectable 40 milliGRAM(s) IV Push two times a day  piperacillin/tazobactam IVPB.. 3.375 Gram(s) IV Intermittent every 8 hours  propofol Infusion 20 MICROgram(s)/kG/Min (9.6 mL/Hr) IV Continuous <Continuous>    MEDICATIONS  (PRN):      LABS:  (02-06 @ 03:02)                        12.4  22.68 )-----------( 145                 39.4    Neutrophils = -- (--%)  Lymphocytes = -- (--%)  Eosinophils = -- (--%)  Basophils = -- (--%)  Monocytes = -- (--%)  Bands = --%    WBC Trend: 22.68<--, 16.00<--, 10.30<--  Hb Trend: 12.4<--, 12.3<--, 14.0<--  Plt Trend: 145<--, 173<--, 260<--  02-06    141  |  106  |  24<H>  ----------------------------<  92  4.2   |  22  |  2.10<H>    Ca    8.6      06 Feb 2021 03:02  Phos  3.5     02-06  Mg     1.8     02-06    TPro  6.6  /  Alb  3.7  /  TBili  0.6  /  DBili  x   /  AST  23  /  ALT  22  /  AlkPhos  77  02-04    Creatinine Trend: 2.10<--, 1.26<--, 1.02<--  PT/INR - ( 06 Feb 2021 03:02 )   PT: 14.2 sec;   INR: 1.25 ratio         PTT - ( 05 Feb 2021 05:35 )  PTT:32.2 sec    Arterial Blood Gas:  02-04 @ 23:45  7.41/38/164/24/99.3/-0.8  ABG lactate: --    Venous Blood Gas:  02-04 @ 13:25  7.34/46/49/23/78.9  VBG Lactate: --          MICROBIOLOGY:   Blood Cx:  Urine Cx:  Sputum Cx:  Legionella:  RVP:    RADIOLOGY:  X Ray:  CT:  MRI:  Ultrasound:  [ ] Reviewed and interpreted by me    EKG:   CHIEF COMPLAINT: Patient is a 85y old  Male who presents with a chief complaint of Hemorrhagic stroke (05 Feb 2021 15:01)    Interval Events: Febrile to 100.9, vanc/zosyn started, blood cultures drawn    REVIEW OF SYSTEMS:  Constitutional:     [ ] negative [ ] fevers [ ] chills [ ] weight loss [ ] weight gain  HEENT:                  [ ] negative [ ] dry eyes [ ] eye irritation [ ] postnasal drip [ ] nasal congestion  CV:                         [ ] negative  [ ] chest pain [ ] orthopnea [ ] palpitations [ ] murmur  Resp:                     [ ] negative [ ] cough [ ] shortness of breath [ ] dyspnea [ ] wheezing [ ] sputum [ ] hemoptysis  GI:                          [ ] negative [ ] nausea [ ] vomiting [ ] diarrhea [ ] constipation [ ] abd pain [ ] dysphagia   :                        [ ] negative [ ] dysuria [ ] nocturia [ ] hematuria [ ] increased urinary frequency  Musculoskeletal: [ ] negative [ ] back pain [ ] myalgias [ ] arthralgias [ ] fracture  Skin:                       [ ] negative [ ] rash [ ] itch  Neurological:        [ ] negative [ ] headache [ ] dizziness [ ] syncope [ ] weakness [ ] numbness  Psychiatric:           [ ] negative [ ] anxiety [ ] depression  Endocrine:            [ ] negative [ ] diabetes [ ] thyroid problem  Heme/Lymph:      [ ] negative [ ] anemia [ ] bleeding problem  Allergic/Immune: [ ] negative [ ] itchy eyes [ ] nasal discharge [ ] hives [ ] angioedema    [ ] All other systems negative  [x] Unable to assess ROS because pt intubated and sedated.    OBJECTIVE:  ICU Vital Signs Last 24 Hrs  T(C): 37.9 (06 Feb 2021 03:05), Max: 38.3 (05 Feb 2021 23:14)  T(F): 100.2 (06 Feb 2021 03:05), Max: 100.9 (05 Feb 2021 23:14)  HR: 94 (06 Feb 2021 06:34) (82 - 97)  BP: 98/60 (06 Feb 2021 03:05) (87/89 - 110/65)  BP(mean): 71 (05 Feb 2021 16:00) (69 - 75)  ABP: --  ABP(mean): --  RR: 18 (06 Feb 2021 03:05) (18 - 18)  SpO2: 96% (06 Feb 2021 06:34) (95% - 99%)    Mode: AC/ CMV (Assist Control/ Continuous Mandatory Ventilation), RR (machine): 18, TV (machine): 450, FiO2: 40, PEEP: 5, ITime: 0.72, MAP: 8, PIP: 17    02-05 @ 07:01  -  02-06 @ 07:00  --------------------------------------------------------  IN: 2048.4 mL / OUT: 600 mL / NET: 1448.4 mL      CAPILLARY BLOOD GLUCOSE      POCT Blood Glucose.: 101 mg/dL (05 Feb 2021 22:26)      PHYSICAL EXAM:  General: WN/WD NAD  HEENT: PERRLA, EOMI, moist mucous membranes  Neurology: sedated  Respiratory: intubated, CTA B/L, normal respiratory effort, no wheezes, crackles, rales  CV: RRR, S1S2, no murmurs, rubs or gallops  Abdominal: Soft, NT, ND +BS, Last BM  Extremities: No edema, + peripheral pulses  Incisions:   Tubes:    HOSPITAL MEDICATIONS:  MEDICATIONS  (STANDING):  chlorhexidine 0.12% Liquid 15 milliLiter(s) Oral Mucosa every 12 hours  chlorhexidine 4% Liquid 1 Application(s) Topical <User Schedule>  fentaNYL   Infusion. 0.5 MICROgram(s)/kG/Hr (4 mL/Hr) IV Continuous <Continuous>  levETIRAcetam  IVPB 500 milliGRAM(s) IV Intermittent every 12 hours  norepinephrine Infusion 0.05 MICROgram(s)/kG/Min (7.5 mL/Hr) IV Continuous <Continuous>  pantoprazole  Injectable 40 milliGRAM(s) IV Push two times a day  piperacillin/tazobactam IVPB.. 3.375 Gram(s) IV Intermittent every 8 hours  propofol Infusion 20 MICROgram(s)/kG/Min (9.6 mL/Hr) IV Continuous <Continuous>    MEDICATIONS  (PRN):      LABS:  (02-06 @ 03:02)                        12.4  22.68 )-----------( 145                 39.4    Neutrophils = -- (--%)  Lymphocytes = -- (--%)  Eosinophils = -- (--%)  Basophils = -- (--%)  Monocytes = -- (--%)  Bands = --%    WBC Trend: 22.68<--, 16.00<--, 10.30<--  Hb Trend: 12.4<--, 12.3<--, 14.0<--  Plt Trend: 145<--, 173<--, 260<--  02-06    141  |  106  |  24<H>  ----------------------------<  92  4.2   |  22  |  2.10<H>    Ca    8.6      06 Feb 2021 03:02  Phos  3.5     02-06  Mg     1.8     02-06    TPro  6.6  /  Alb  3.7  /  TBili  0.6  /  DBili  x   /  AST  23  /  ALT  22  /  AlkPhos  77  02-04    Creatinine Trend: 2.10<--, 1.26<--, 1.02<--  PT/INR - ( 06 Feb 2021 03:02 )   PT: 14.2 sec;   INR: 1.25 ratio         PTT - ( 05 Feb 2021 05:35 )  PTT:32.2 sec    Arterial Blood Gas:  02-04 @ 23:45  7.41/38/164/24/99.3/-0.8  ABG lactate: --    Venous Blood Gas:  02-04 @ 13:25  7.34/46/49/23/78.9  VBG Lactate: --          MICROBIOLOGY:   Blood Cx:  Urine Cx:  Sputum Cx:  Legionella:  RVP:    RADIOLOGY:  X Ray:  CT:  MRI:  Ultrasound:  [ ] Reviewed and interpreted by me    EKG:

## 2021-02-07 NOTE — PROGRESS NOTE ADULT - SUBJECTIVE AND OBJECTIVE BOX
INTERVAL HPI/OVERNIGHT EVENTS:    SUBJECTIVE: Patient seen and examined at bedside.     CONSTITUTIONAL: No weakness, fevers or chills  EYES/ENT: No visual changes;  No vertigo or throat pain   NECK: No pain or stiffness  RESPIRATORY: No cough, wheezing, hemoptysis; No shortness of breath  CARDIOVASCULAR: No chest pain or palpitations  GASTROINTESTINAL: No abdominal or epigastric pain. No nausea, vomiting, or hematemesis; No diarrhea or constipation. No melena or hematochezia.  GENITOURINARY: No dysuria, frequency or hematuria  NEUROLOGICAL: No numbness or weakness  SKIN: No itching, rashes    OBJECTIVE:  VITAL SIGNS:  ICU Vital Signs Last 24 Hrs  T(C): 38.3 (07 Feb 2021 05:15), Max: 39.1 (06 Feb 2021 16:00)  T(F): 100.9 (07 Feb 2021 05:15), Max: 102.3 (06 Feb 2021 16:00)  HR: 104 (07 Feb 2021 04:00) (90 - 160)  BP: 108/65 (07 Feb 2021 04:00) (91/60 - 117/76)  BP(mean): 78 (07 Feb 2021 04:00) (66 - 88)  RR: 22 (07 Feb 2021 04:00) (18 - 25)  SpO2: 96% (07 Feb 2021 04:00) (92% - 97%)    Mode: AC/ CMV (Assist Control/ Continuous Mandatory Ventilation), RR (machine): 18, TV (machine): 450, FiO2: 80, PEEP: 5, ITime: 0.84, MAP: 9, PIP: 21    02-06 @ 07:01  -  02-07 @ 07:00  --------------------------------------------------------  IN: 1857.5 mL / OUT: 740 mL / NET: 1117.5 mL    CAPILLARY BLOOD GLUCOSE  POCT Blood Glucose.: 95 mg/dL (07 Feb 2021 06:41)    PHYSICAL EXAM:  General: NAD, sedated intubated  HEENT: NC/AT; PERRL, clear conjunctiva  Respiratory: CTA b/l  Cardiovascular: +S1/S2; RRR  Abdomen: soft, NT/ND; +BS x4  Extremities: WWP, 2+ peripheral pulses b/l; no LE edema  Skin: normal color and turgor; no rash  Neurological: unable to assess    MEDICATIONS:  MEDICATIONS  (STANDING):  aMIOdarone Infusion 0.5 mG/Min (16.7 mL/Hr) IV Continuous <Continuous>  aMIOdarone Infusion 1 mG/Min (33.3 mL/Hr) IV Continuous <Continuous>  calcium gluconate IVPB 2 Gram(s) IV Intermittent once  chlorhexidine 0.12% Liquid 15 milliLiter(s) Oral Mucosa every 12 hours  chlorhexidine 4% Liquid 1 Application(s) Topical <User Schedule>  fentaNYL   Infusion. 0.5 MICROgram(s)/kG/Hr (4 mL/Hr) IV Continuous <Continuous>  lactated ringers. 1000 milliLiter(s) (100 mL/Hr) IV Continuous <Continuous>  levETIRAcetam  IVPB 500 milliGRAM(s) IV Intermittent every 12 hours  pantoprazole  Injectable 40 milliGRAM(s) IV Push two times a day  phenylephrine    Infusion 0.1 MICROgram(s)/kG/Min (3 mL/Hr) IV Continuous <Continuous>  piperacillin/tazobactam IVPB.. 3.375 Gram(s) IV Intermittent every 8 hours  propofol Infusion 20 MICROgram(s)/kG/Min (9.6 mL/Hr) IV Continuous <Continuous>    ALLERGIES:  Allergies  No Known Allergies  Intolerances      LABS:          ABG - ( 07 Feb 2021 01:16 )  pH, Arterial: 7.34  pH, Blood: x     /  pCO2: 37    /  pO2: 84    / HCO3: 20    / Base Excess: -5.7  /  SaO2: 95.9                      9.5    11.57 )-----------( 92       ( 07 Feb 2021 03:50 )             29.6     02-07    140  |  112<H>  |  29<H>  ----------------------------<  84  3.6   |  15<L>  |  2.18<H>    Ca    6.4<LL>      07 Feb 2021 03:50  Phos  3.2     02-07  Mg     1.5     02-07    TPro  x   /  Alb  2.0<L>  /  TBili  x   /  DBili  x   /  AST  x   /  ALT  x   /  AlkPhos  x   02-07    PT/INR - ( 07 Feb 2021 03:50 )   PT: 16.5 sec;   INR: 1.47 ratio    PTT - ( 07 Feb 2021 03:50 )  PTT:39.1 sec    RADIOLOGY & ADDITIONAL TESTS: Reviewed. INTERVAL HPI/OVERNIGHT EVENTS: febrile 102.3 yesterday 1600 and 100.9 this AM.     SUBJECTIVE: Patient seen and examined at bedside. Unable to obtain ROS.    OBJECTIVE:  VITAL SIGNS:  ICU Vital Signs Last 24 Hrs  T(C): 38.3 (07 Feb 2021 05:15), Max: 39.1 (06 Feb 2021 16:00)  T(F): 100.9 (07 Feb 2021 05:15), Max: 102.3 (06 Feb 2021 16:00)  HR: 104 (07 Feb 2021 04:00) (90 - 160)  BP: 108/65 (07 Feb 2021 04:00) (91/60 - 117/76)  BP(mean): 78 (07 Feb 2021 04:00) (66 - 88)  RR: 22 (07 Feb 2021 04:00) (18 - 25)  SpO2: 96% (07 Feb 2021 04:00) (92% - 97%)    Mode: AC/ CMV (Assist Control/ Continuous Mandatory Ventilation), RR (machine): 18, TV (machine): 450, FiO2: 80, PEEP: 5, ITime: 0.84, MAP: 9, PIP: 21    02-06 @ 07:01  -  02-07 @ 07:00  --------------------------------------------------------  IN: 1857.5 mL / OUT: 740 mL / NET: 1117.5 mL    CAPILLARY BLOOD GLUCOSE  POCT Blood Glucose.: 95 mg/dL (07 Feb 2021 06:41)    PHYSICAL EXAM:  General: NAD, sedated intubated  HEENT: NC/AT; PERRL, clear conjunctiva  Respiratory: CTA b/l  Cardiovascular: +S1/S2; RRR  Abdomen: soft, NT/ND; +BS x4  Extremities: WWP, 2+ peripheral pulses b/l; no LE edema  Skin: normal color and turgor; no rash  Neurological: unable to assess    MEDICATIONS:  MEDICATIONS  (STANDING):  aMIOdarone Infusion 0.5 mG/Min (16.7 mL/Hr) IV Continuous <Continuous>  aMIOdarone Infusion 1 mG/Min (33.3 mL/Hr) IV Continuous <Continuous>  calcium gluconate IVPB 2 Gram(s) IV Intermittent once  chlorhexidine 0.12% Liquid 15 milliLiter(s) Oral Mucosa every 12 hours  chlorhexidine 4% Liquid 1 Application(s) Topical <User Schedule>  fentaNYL   Infusion. 0.5 MICROgram(s)/kG/Hr (4 mL/Hr) IV Continuous <Continuous>  lactated ringers. 1000 milliLiter(s) (100 mL/Hr) IV Continuous <Continuous>  levETIRAcetam  IVPB 500 milliGRAM(s) IV Intermittent every 12 hours  pantoprazole  Injectable 40 milliGRAM(s) IV Push two times a day  phenylephrine    Infusion 0.1 MICROgram(s)/kG/Min (3 mL/Hr) IV Continuous <Continuous>  piperacillin/tazobactam IVPB.. 3.375 Gram(s) IV Intermittent every 8 hours  propofol Infusion 20 MICROgram(s)/kG/Min (9.6 mL/Hr) IV Continuous <Continuous>    ALLERGIES:  Allergies  No Known Allergies  Intolerances    LABS:          ABG - ( 07 Feb 2021 01:16 )  pH, Arterial: 7.34  pH, Blood: x     /  pCO2: 37    /  pO2: 84    / HCO3: 20    / Base Excess: -5.7  /  SaO2: 95.9                      9.5    11.57 )-----------( 92       ( 07 Feb 2021 03:50 )             29.6     02-07    140  |  112<H>  |  29<H>  ----------------------------<  84  3.6   |  15<L>  |  2.18<H>    Ca    6.4<LL>      07 Feb 2021 03:50  Phos  3.2     02-07  Mg     1.5     02-07    TPro  x   /  Alb  2.0<L>  /  TBili  x   /  DBili  x   /  AST  x   /  ALT  x   /  AlkPhos  x   02-07    PT/INR - ( 07 Feb 2021 03:50 )   PT: 16.5 sec;   INR: 1.47 ratio    PTT - ( 07 Feb 2021 03:50 )  PTT:39.1 sec    Culture - Blood (02.06.21 @ 14:26)    Specimen Source: .Blood Blood-Venous    Culture Results:   No growth to date.    Culture - Blood (02.06.21 @ 14:21)    Specimen Source: .Blood Blood-Peripheral    Culture Results:   No growth to date.    Culture - Sputum . (02.06.21 @ 20:54)    Gram Stain:   Moderate polymorphonuclear leukocytes per low power field  Rare Squamous epithelial cells per low power field  Few Gram Variable Rods per oil power field  Few Gram Positive Cocci in Pairs and Chains per oil power field    Specimen Source: .Sputum Sputum      RADIOLOGY & ADDITIONAL TESTS: Reviewed.

## 2021-02-07 NOTE — PROGRESS NOTE ADULT - ASSESSMENT
85y Male unknown PMH presents to the ED with right side weakness intubated in ER for decline in mental status and found to have large L frontotemporal hemorrhage with midline shift and intraventricular extension, as well as a left SDH. possibly due to hypertension vs. amyloid angiopathy.    #Neuro  large L frontotemporal hemorrhage w/ midline shift and SDH, suspect d/t HTn vs amyloid angiopathy  - s/p xarelto reversal with Andexanet amos, DDAVP, 1u pRBC  - holding AP and AC   - BP goal <160/90   - MRI brain/ Repeat CT head will not , not a surgical candidate by [facility]  - keppra for seizure ppx  - neurology / NSGY recs appreciated; not a surgical candidate  - sedated on propofol and fentanyl    #CV  Shock state 2/2 sedation  -c/w Levophed, Goal <140/90, wean as possible, on minimal dose currently    #Pulm  -Intubated for airway protection, manage vent settings  -ABG checked, wnl.     #GI  -OGT in place  - TF held due to residuals and coffee ground emesis, OG tube to suction  - PPI    #Renal  - Cr 1.6 -> 2.1 with poor UO    #ID  - WC jump from 16.6 -> 22, started on Vanc/Zosyn; Bcx drawn    #Heme  -On xarelto & ASA at home  -Holding all AC and anti-platelet for now s/p reversal and 1u platelets    #Endo  -no active issues    #DVT ppx  -SCDs    #GOC  -explained to grandsons (Marshall and Néstor) regarding the very poor prognosis of the bleed. Grandsons will hold family meeting regarding further GOC.   2/4- Spoke with grandson, communicated poor prognosis, as of now pt remains full code and son wants everything done. He will discuss with family. Palliative consulted.    85y Male unknown PMH presents to the ED with right side weakness intubated in ER for decline in mental status and found to have large L frontotemporal hemorrhage with midline shift and intraventricular extension, as well as a left SDH. possibly due to hypertension vs. amyloid angiopathy.    #Neuro  large L frontotemporal hemorrhage w/ midline shift and SDH, suspect d/t HTn vs amyloid angiopathy  - s/p xarelto reversal with Andexanet amos, DDAVP, 1u pRBC  - holding AP and AC   - BP goal <160/90   - MRI brain/ Repeat CT head will not , not a surgical candidate by NSGY  - keppra for seizure ppx  - neurology / NSGY recs appreciated; not a surgical candidate  - sedated on propofol and fentanyl    #CV  Shock state 2/2 sedation  -c/w Levophed, Goal <140/90, wean as possible, on minimal dose currently    #Pulm  -Intubated for airway protection, manage vent settings  - Pt requiring increased O2, 80% will reduce to 60%   - f/u ABG     #GI  - OGT in place  - TF held due to residuals and coffee ground emesis, OG tube to suction  - PPI    #Renal  - Cr 1.6 -> 2.1 with poor UO    #ID  - WC downtrending,   - started on Vanc/Zosyn;   - Bcx drawn - NGTD, sputum - few variable rods and pos cocci    #Heme  -On Xarelto & ASA at home  -Holding all AC and anti-platelet for now, s/p reversal and 1u platelets    #Endo  -no active issues    #DVT ppx  -SCDs    #GOC  -explained to grandsons (Marshall and Néstor) regarding the very poor prognosis of the bleed. Grandsons will hold family meeting regarding further GOC.   2/4- Spoke with grandson, communicated poor prognosis, as of now pt remains full code and son wants everything done. He will discuss with family. Palliative consulted.    85y Male unknown PMH presents to the ED with right side weakness intubated in ER for decline in mental status and found to have large L frontotemporal hemorrhage with midline shift and intraventricular extension, as well as a left SDH. possibly due to hypertension vs. amyloid angiopathy.    #Neuro  large L frontotemporal hemorrhage w/ midline shift and SDH, suspect d/t HTn vs amyloid angiopathy  - s/p xarelto reversal with Andexanet amos, DDAVP, 1u pRBC  - holding AP and AC   - BP goal <160/90   - MRI brain/ Repeat CT head will not , not a surgical candidate by NSGY  - keppra for seizure ppx  - neurology / NSGY recs appreciated; not a surgical candidate  - sedated on propofol and fentanyl    #CV  Shock state 2/2 sedation  -c/w Levophed, Goal <140/90, wean as possible, on minimal dose currently    #Pulm  -Intubated for airway protection, manage vent settings  - Pt requiring increased O2, 80% will reduce to 60%, likely in the setting of aspiration  -cont coverage for asp PNA.  - f/u ABG     #GI  - OGT in place  - TF held due to residuals and coffee ground emesis, OG tube to suction  - PPI    #Renal  - Cr 1.6 -> 2.1 with poor UO    #ID  - WC downtrending,   - started on Vanc/Zosyn;   - Bcx drawn - NGTD, sputum - few variable rods and pos cocci    #Heme  -On Xarelto & ASA at home  -Holding all AC and anti-platelet for now, s/p reversal and 1u platelets    #Endo  -no active issues    #DVT ppx  -SCDs    #GOC  -explained to grandsons (Marshall and Néstor) regarding the very poor prognosis of the bleed. Grandsons will hold family meeting regarding further GOC.   2/4- Spoke with grandson, communicated poor prognosis, as of now pt remains full code and son wants everything done. He will discuss with family. Palliative consulted.

## 2021-02-08 NOTE — PROGRESS NOTE ADULT - SUBJECTIVE AND OBJECTIVE BOX
CHIEF COMPLAINT:    OVERNIGHT / SUBJECTIVE: Received Lopressor 5mg for tachycardia. Remains tachycardic despite this and amiodarone    REVIEW OF SYSTEMS: Patient seen and examined at bedside. Unable to obtain ROS.      OBJECTIVE:  ICU Vital Signs Last 24 Hrs  T(C): 38.4 (08 Feb 2021 08:00), Max: 38.4 (08 Feb 2021 08:00)  T(F): 101.1 (08 Feb 2021 08:00), Max: 101.1 (08 Feb 2021 08:00)  HR: 135 (08 Feb 2021 10:32) (80 - 163)  BP: 91/80 (08 Feb 2021 08:00) (91/80 - 111/67)  BP(mean): 85 (08 Feb 2021 08:00) (63 - 85)  RR: 21 (08 Feb 2021 08:00) (18 - 31)  SpO2: 90% (08 Feb 2021 10:32) (90% - 95%)    Mode: AC/ CMV (Assist Control/ Continuous Mandatory Ventilation), RR (machine): 18, TV (machine): 450, FiO2: 100, PEEP: 5, ITime: 0.74, MAP: 8, PIP: 22    02-07 @ 07:01  -  02-08 @ 07:00  --------------------------------------------------------  IN: 2847.3 mL / OUT: 840 mL / NET: 2007.3 mL      CAPILLARY BLOOD GLUCOSE      POCT Blood Glucose.: 86 mg/dL (07 Feb 2021 18:35)    I&O's Summary    07 Feb 2021 07:01  -  08 Feb 2021 07:00  --------------------------------------------------------  IN: 2847.3 mL / OUT: 840 mL / NET: 2007.3 mL        PHYSICAL EXAM  General: NAD, sedated intubated  HEENT: NC/AT; pupils non reactive, clear conjunctiva  Respiratory: CTA b/l  Cardiovascular: +S1/S2; RRR  Abdomen: soft, NT/ND; +BS x4  Extremities: WWP, 2+ peripheral pulses b/l; no LE edema  Skin: normal color and turgor; no rash  Neurological: unable to assess    LABS:                        9.8    10.46 )-----------( 85       ( 08 Feb 2021 01:33 )             31.7     02-08    135  |  109<H>  |  38<H>  ----------------------------<  105<H>  4.0   |  15<L>  |  2.28<H>    Ca    6.5<LL>      08 Feb 2021 01:33  Phos  3.2     02-08  Mg     1.6     02-08    TPro  5.0<L>  /  Alb  1.8<L>  /  TBili  1.2  /  DBili  x   /  AST  64<H>  /  ALT  31  /  AlkPhos  50  02-07        LINES:    HOSPITAL MEDICATIONS:  Standing Meds:  aMIOdarone    Tablet 200 milliGRAM(s) Oral daily  chlorhexidine 0.12% Liquid 15 milliLiter(s) Oral Mucosa every 12 hours  chlorhexidine 4% Liquid 1 Application(s) Topical <User Schedule>  fentaNYL   Infusion. 0.5 MICROgram(s)/kG/Hr IV Continuous <Continuous>  levETIRAcetam  IVPB 500 milliGRAM(s) IV Intermittent every 12 hours  pantoprazole  Injectable 40 milliGRAM(s) IV Push two times a day  phenylephrine    Infusion 0.1 MICROgram(s)/kG/Min IV Continuous <Continuous>  piperacillin/tazobactam IVPB.. 3.375 Gram(s) IV Intermittent every 8 hours      PRN Meds:  acetaminophen    Suspension .. 650 milliGRAM(s) Oral every 6 hours PRN      LABS:                        9.8    10.46 )-----------( 85       ( 08 Feb 2021 01:33 )             31.7     Hgb Trend: 9.8<--, 9.5<--, 11.5<--, 9.5<--, 12.4<--  02-08    135  |  109<H>  |  38<H>  ----------------------------<  105<H>  4.0   |  15<L>  |  2.28<H>    Ca    6.5<LL>      08 Feb 2021 01:33  Phos  3.2     02-08  Mg     1.6     02-08    TPro  5.0<L>  /  Alb  1.8<L>  /  TBili  1.2  /  DBili  x   /  AST  64<H>  /  ALT  31  /  AlkPhos  50  02-07    Creatinine Trend: 2.28<--, 2.36<--, 2.93<--, 2.18<--, 2.10<--, 1.26<--  PT/INR - ( 08 Feb 2021 01:33 )   PT: 14.2 sec;   INR: 1.25 ratio         PTT - ( 08 Feb 2021 01:33 )  PTT:43.5 sec    Arterial Blood Gas:  02-07 @ 14:56  7.30/41/77/20/94.3/-5.7  ABG lactate: --  Arterial Blood Gas:  02-07 @ 01:16  7.34/37/84/20/95.9/-5.7  ABG lactate: --      PT/INR - ( 08 Feb 2021 01:33 )   PT: 14.2 sec;   INR: 1.25 ratio         PTT - ( 08 Feb 2021 01:33 )  PTT:43.5 sec          MICROBIOLOGY:     RADIOLOGY: Reviewed CHIEF COMPLAINT:    OVERNIGHT / SUBJECTIVE: Received Lopressor 5mg for tachycardia. Remains tachycardic despite this and amiodarone. Requiring increased oxygen due to hypoxia; now on 100% FiO2    REVIEW OF SYSTEMS: Patient seen and examined at bedside. Unable to obtain ROS.      OBJECTIVE:  ICU Vital Signs Last 24 Hrs  T(C): 38.4 (08 Feb 2021 08:00), Max: 38.4 (08 Feb 2021 08:00)  T(F): 101.1 (08 Feb 2021 08:00), Max: 101.1 (08 Feb 2021 08:00)  HR: 135 (08 Feb 2021 10:32) (80 - 163)  BP: 91/80 (08 Feb 2021 08:00) (91/80 - 111/67)  BP(mean): 85 (08 Feb 2021 08:00) (63 - 85)  RR: 21 (08 Feb 2021 08:00) (18 - 31)  SpO2: 90% (08 Feb 2021 10:32) (90% - 95%)    Mode: AC/ CMV (Assist Control/ Continuous Mandatory Ventilation), RR (machine): 18, TV (machine): 450, FiO2: 100, PEEP: 5, ITime: 0.74, MAP: 8, PIP: 22    02-07 @ 07:01  -  02-08 @ 07:00  --------------------------------------------------------  IN: 2847.3 mL / OUT: 840 mL / NET: 2007.3 mL      CAPILLARY BLOOD GLUCOSE      POCT Blood Glucose.: 86 mg/dL (07 Feb 2021 18:35)    I&O's Summary    07 Feb 2021 07:01  -  08 Feb 2021 07:00  --------------------------------------------------------  IN: 2847.3 mL / OUT: 840 mL / NET: 2007.3 mL        PHYSICAL EXAM  General: NAD, sedated intubated  HEENT: NC/AT; pupils non reactive, clear conjunctiva  Respiratory: CTA b/l  Cardiovascular: +S1/S2; RRR  Abdomen: soft, NT/ND; +BS x4  Extremities: WWP, 2+ peripheral pulses b/l; no LE edema  Skin: normal color and turgor; no rash  Neurological: unable to assess    LABS:                        9.8    10.46 )-----------( 85       ( 08 Feb 2021 01:33 )             31.7     02-08    135  |  109<H>  |  38<H>  ----------------------------<  105<H>  4.0   |  15<L>  |  2.28<H>    Ca    6.5<LL>      08 Feb 2021 01:33  Phos  3.2     02-08  Mg     1.6     02-08    TPro  5.0<L>  /  Alb  1.8<L>  /  TBili  1.2  /  DBili  x   /  AST  64<H>  /  ALT  31  /  AlkPhos  50  02-07        LINES:    HOSPITAL MEDICATIONS:  Standing Meds:  aMIOdarone    Tablet 200 milliGRAM(s) Oral daily  chlorhexidine 0.12% Liquid 15 milliLiter(s) Oral Mucosa every 12 hours  chlorhexidine 4% Liquid 1 Application(s) Topical <User Schedule>  fentaNYL   Infusion. 0.5 MICROgram(s)/kG/Hr IV Continuous <Continuous>  levETIRAcetam  IVPB 500 milliGRAM(s) IV Intermittent every 12 hours  pantoprazole  Injectable 40 milliGRAM(s) IV Push two times a day  phenylephrine    Infusion 0.1 MICROgram(s)/kG/Min IV Continuous <Continuous>  piperacillin/tazobactam IVPB.. 3.375 Gram(s) IV Intermittent every 8 hours      PRN Meds:  acetaminophen    Suspension .. 650 milliGRAM(s) Oral every 6 hours PRN      LABS:                        9.8    10.46 )-----------( 85       ( 08 Feb 2021 01:33 )             31.7     Hgb Trend: 9.8<--, 9.5<--, 11.5<--, 9.5<--, 12.4<--  02-08    135  |  109<H>  |  38<H>  ----------------------------<  105<H>  4.0   |  15<L>  |  2.28<H>    Ca    6.5<LL>      08 Feb 2021 01:33  Phos  3.2     02-08  Mg     1.6     02-08    TPro  5.0<L>  /  Alb  1.8<L>  /  TBili  1.2  /  DBili  x   /  AST  64<H>  /  ALT  31  /  AlkPhos  50  02-07    Creatinine Trend: 2.28<--, 2.36<--, 2.93<--, 2.18<--, 2.10<--, 1.26<--  PT/INR - ( 08 Feb 2021 01:33 )   PT: 14.2 sec;   INR: 1.25 ratio         PTT - ( 08 Feb 2021 01:33 )  PTT:43.5 sec    Arterial Blood Gas:  02-07 @ 14:56  7.30/41/77/20/94.3/-5.7  ABG lactate: --  Arterial Blood Gas:  02-07 @ 01:16  7.34/37/84/20/95.9/-5.7  ABG lactate: --      PT/INR - ( 08 Feb 2021 01:33 )   PT: 14.2 sec;   INR: 1.25 ratio         PTT - ( 08 Feb 2021 01:33 )  PTT:43.5 sec          MICROBIOLOGY:     RADIOLOGY: Reviewed CHIEF COMPLAINT: hemorrhagic stroke    OVERNIGHT / SUBJECTIVE: Received Lopressor 5mg for tachycardia. Remains tachycardic despite this and amiodarone. Also requiring increased oxygen due to hypoxia; now on 100% FiO2    REVIEW OF SYSTEMS: Patient seen and examined at bedside. Unable to obtain ROS.      OBJECTIVE:  ICU Vital Signs Last 24 Hrs  T(C): 38.4 (08 Feb 2021 08:00), Max: 38.4 (08 Feb 2021 08:00)  T(F): 101.1 (08 Feb 2021 08:00), Max: 101.1 (08 Feb 2021 08:00)  HR: 135 (08 Feb 2021 10:32) (80 - 163)  BP: 91/80 (08 Feb 2021 08:00) (91/80 - 111/67)  BP(mean): 85 (08 Feb 2021 08:00) (63 - 85)  RR: 21 (08 Feb 2021 08:00) (18 - 31)  SpO2: 90% (08 Feb 2021 10:32) (90% - 95%)    Mode: AC/ CMV (Assist Control/ Continuous Mandatory Ventilation), RR (machine): 18, TV (machine): 450, FiO2: 100, PEEP: 5, ITime: 0.74, MAP: 8, PIP: 22    02-07 @ 07:01  -  02-08 @ 07:00  --------------------------------------------------------  IN: 2847.3 mL / OUT: 840 mL / NET: 2007.3 mL      CAPILLARY BLOOD GLUCOSE      POCT Blood Glucose.: 86 mg/dL (07 Feb 2021 18:35)    I&O's Summary    07 Feb 2021 07:01  -  08 Feb 2021 07:00  --------------------------------------------------------  IN: 2847.3 mL / OUT: 840 mL / NET: 2007.3 mL        PHYSICAL EXAM  General: NAD, sedated intubated  HEENT: NC/AT; pupils non reactive, clear conjunctiva  Respiratory: CTA b/l  Cardiovascular: +S1/S2; RRR  Abdomen: soft, NT/ND; +BS x4  Extremities: WWP, 2+ peripheral pulses b/l; no LE edema  Skin: normal color and turgor; no rash  Neurological: unable to assess    LABS:                        9.8    10.46 )-----------( 85       ( 08 Feb 2021 01:33 )             31.7     02-08    135  |  109<H>  |  38<H>  ----------------------------<  105<H>  4.0   |  15<L>  |  2.28<H>    Ca    6.5<LL>      08 Feb 2021 01:33  Phos  3.2     02-08  Mg     1.6     02-08    TPro  5.0<L>  /  Alb  1.8<L>  /  TBili  1.2  /  DBili  x   /  AST  64<H>  /  ALT  31  /  AlkPhos  50  02-07        LINES:    HOSPITAL MEDICATIONS:  Standing Meds:  aMIOdarone    Tablet 200 milliGRAM(s) Oral daily  chlorhexidine 0.12% Liquid 15 milliLiter(s) Oral Mucosa every 12 hours  chlorhexidine 4% Liquid 1 Application(s) Topical <User Schedule>  fentaNYL   Infusion. 0.5 MICROgram(s)/kG/Hr IV Continuous <Continuous>  levETIRAcetam  IVPB 500 milliGRAM(s) IV Intermittent every 12 hours  pantoprazole  Injectable 40 milliGRAM(s) IV Push two times a day  phenylephrine    Infusion 0.1 MICROgram(s)/kG/Min IV Continuous <Continuous>  piperacillin/tazobactam IVPB.. 3.375 Gram(s) IV Intermittent every 8 hours      PRN Meds:  acetaminophen    Suspension .. 650 milliGRAM(s) Oral every 6 hours PRN      LABS:                        9.8    10.46 )-----------( 85       ( 08 Feb 2021 01:33 )             31.7     Hgb Trend: 9.8<--, 9.5<--, 11.5<--, 9.5<--, 12.4<--  02-08    135  |  109<H>  |  38<H>  ----------------------------<  105<H>  4.0   |  15<L>  |  2.28<H>    Ca    6.5<LL>      08 Feb 2021 01:33  Phos  3.2     02-08  Mg     1.6     02-08    TPro  5.0<L>  /  Alb  1.8<L>  /  TBili  1.2  /  DBili  x   /  AST  64<H>  /  ALT  31  /  AlkPhos  50  02-07    Creatinine Trend: 2.28<--, 2.36<--, 2.93<--, 2.18<--, 2.10<--, 1.26<--  PT/INR - ( 08 Feb 2021 01:33 )   PT: 14.2 sec;   INR: 1.25 ratio         PTT - ( 08 Feb 2021 01:33 )  PTT:43.5 sec    Arterial Blood Gas:  02-07 @ 14:56  7.30/41/77/20/94.3/-5.7  Arterial Blood Gas:  02-07 @ 01:16  7.34/37/84/20/95.9/-5.7      PT/INR - ( 08 Feb 2021 01:33 )   PT: 14.2 sec;   INR: 1.25 ratio         PTT - ( 08 Feb 2021 01:33 )  PTT:43.5 sec      RADIOLOGY: Reviewed CHIEF COMPLAINT: hemorrhagic stroke    OVERNIGHT / SUBJECTIVE: Received Lopressor 5mg for tachycardia. Remains tachycardic despite this and amiodarone. Also requiring increased oxygen due to hypoxia; now on 100% FiO2    REVIEW OF SYSTEMS: Patient seen and examined at bedside. Unable to obtain ROS due to being intubated.      OBJECTIVE:  ICU Vital Signs Last 24 Hrs  T(C): 38.4 (08 Feb 2021 08:00), Max: 38.4 (08 Feb 2021 08:00)  T(F): 101.1 (08 Feb 2021 08:00), Max: 101.1 (08 Feb 2021 08:00)  HR: 135 (08 Feb 2021 10:32) (80 - 163)  BP: 91/80 (08 Feb 2021 08:00) (91/80 - 111/67)  BP(mean): 85 (08 Feb 2021 08:00) (63 - 85)  RR: 21 (08 Feb 2021 08:00) (18 - 31)  SpO2: 90% (08 Feb 2021 10:32) (90% - 95%)    Mode: AC/ CMV (Assist Control/ Continuous Mandatory Ventilation), RR (machine): 18, TV (machine): 450, FiO2: 100, PEEP: 5, ITime: 0.74, MAP: 8, PIP: 22    02-07 @ 07:01  -  02-08 @ 07:00  --------------------------------------------------------  IN: 2847.3 mL / OUT: 840 mL / NET: 2007.3 mL      CAPILLARY BLOOD GLUCOSE      POCT Blood Glucose.: 86 mg/dL (07 Feb 2021 18:35)    I&O's Summary    07 Feb 2021 07:01  -  08 Feb 2021 07:00  --------------------------------------------------------  IN: 2847.3 mL / OUT: 840 mL / NET: 2007.3 mL        PHYSICAL EXAM  General: NAD, sedated intubated  HEENT: NC/AT; pupils non reactive, clear conjunctiva  Respiratory: CTA b/l  Cardiovascular: +S1/S2; RRR  Abdomen: soft, NT/ND; +BS x4  Extremities: WWP, 2+ peripheral pulses b/l; no LE edema  Skin: normal color and turgor; no rash  Neurological: unable to assess    LABS:                        9.8    10.46 )-----------( 85       ( 08 Feb 2021 01:33 )             31.7     02-08    135  |  109<H>  |  38<H>  ----------------------------<  105<H>  4.0   |  15<L>  |  2.28<H>    Ca    6.5<LL>      08 Feb 2021 01:33  Phos  3.2     02-08  Mg     1.6     02-08    TPro  5.0<L>  /  Alb  1.8<L>  /  TBili  1.2  /  DBili  x   /  AST  64<H>  /  ALT  31  /  AlkPhos  50  02-07        LINES:    HOSPITAL MEDICATIONS:  Standing Meds:  aMIOdarone    Tablet 200 milliGRAM(s) Oral daily  chlorhexidine 0.12% Liquid 15 milliLiter(s) Oral Mucosa every 12 hours  chlorhexidine 4% Liquid 1 Application(s) Topical <User Schedule>  fentaNYL   Infusion. 0.5 MICROgram(s)/kG/Hr IV Continuous <Continuous>  levETIRAcetam  IVPB 500 milliGRAM(s) IV Intermittent every 12 hours  pantoprazole  Injectable 40 milliGRAM(s) IV Push two times a day  phenylephrine    Infusion 0.1 MICROgram(s)/kG/Min IV Continuous <Continuous>  piperacillin/tazobactam IVPB.. 3.375 Gram(s) IV Intermittent every 8 hours      PRN Meds:  acetaminophen    Suspension .. 650 milliGRAM(s) Oral every 6 hours PRN      LABS:                        9.8    10.46 )-----------( 85       ( 08 Feb 2021 01:33 )             31.7     Hgb Trend: 9.8<--, 9.5<--, 11.5<--, 9.5<--, 12.4<--  02-08    135  |  109<H>  |  38<H>  ----------------------------<  105<H>  4.0   |  15<L>  |  2.28<H>    Ca    6.5<LL>      08 Feb 2021 01:33  Phos  3.2     02-08  Mg     1.6     02-08    TPro  5.0<L>  /  Alb  1.8<L>  /  TBili  1.2  /  DBili  x   /  AST  64<H>  /  ALT  31  /  AlkPhos  50  02-07    Creatinine Trend: 2.28<--, 2.36<--, 2.93<--, 2.18<--, 2.10<--, 1.26<--  PT/INR - ( 08 Feb 2021 01:33 )   PT: 14.2 sec;   INR: 1.25 ratio         PTT - ( 08 Feb 2021 01:33 )  PTT:43.5 sec    Arterial Blood Gas:  02-07 @ 14:56  7.30/41/77/20/94.3/-5.7  Arterial Blood Gas:  02-07 @ 01:16  7.34/37/84/20/95.9/-5.7      PT/INR - ( 08 Feb 2021 01:33 )   PT: 14.2 sec;   INR: 1.25 ratio         PTT - ( 08 Feb 2021 01:33 )  PTT:43.5 sec      RADIOLOGY: Reviewed

## 2021-02-08 NOTE — PROGRESS NOTE ADULT - ASSESSMENT
85y Male unknown PMH presents to the ED with right side weakness intubated in ER for decline in mental status and found to have large L frontotemporal hemorrhage with midline shift and intraventricular extension, as well as a left SDH. possibly due to hypertension vs. amyloid angiopathy.    #Neuro  large L frontotemporal hemorrhage w/ midline shift and SDH, suspect d/t HTn vs amyloid angiopathy  - s/p xarelto reversal with Andexanet amos, DDAVP, 1u pRBC  - holding AP and AC   - BP goal <160/90   - MRI brain/ Repeat CT head will not , not a surgical candidate by NSGY  - keppra for seizure ppx  - neurology / NSGY recs appreciated; not a surgical candidate  - sedated on propofol and fentanyl    #CV  Shock state 2/2 sedation  -c/w Levophed, Goal <140/90, wean as possible, on minimal dose currently    #Pulm  -Intubated for airway protection, manage vent settings  - Pt requiring increased O2, increased to 100%, likely in the setting of aspiration  -cont coverage for asp PNA.  - f/u ABG     #GI  - OGT in place  - TF held due to residuals and coffee ground emesis, OG tube to suction  - PPI    #Renal  - Cr 2.4 -> 2.3 still with poor UO    #ID  - WC downtrending,   - started on Vanc/Zosyn;   - Bcx drawn - NGTD, sputum - few variable rods and pos cocci    #Heme  -On Xarelto & ASA at home  -Holding all AC and anti-platelet for now, s/p reversal and 1u platelets    #Endo  -no active issues    #DVT ppx  -SCDs    #GOC  -explained to grandsons (Marshall and Néstor) regarding the very poor prognosis of the bleed. Grandsons will hold family meeting regarding further GOC.   2/4- Spoke with grandson, communicated poor prognosis, as of now pt remains full code and son wants everything done. He will discuss with family. Palliative consulted.  85y Male unknown PMH presents to the ED with right side weakness intubated in ER for decline in mental status and found to have large L frontotemporal hemorrhage with midline shift and intraventricular extension, as well as a left SDH. possibly due to hypertension vs. amyloid angiopathy.    #Neuro  large L frontotemporal hemorrhage w/ midline shift and SDH, suspect d/t HTn vs amyloid angiopathy  - s/p xarelto reversal with Andexanet amos, DDAVP, 1u pRBC  - holding AP and AC   - BP goal <160/90   - MRI brain/ Repeat CT head will not , not a surgical candidate by BANDAR  - keppra for seizure ppx  - neurology / NSGY recs appreciated; not a surgical candidate  - sedated on propofol and fentanyl    #CV  Shock state 2/2 sedation  -c/w Levophed, Goal <140/90, wean as possible, on minimal dose currently    #Pulm  -Intubated for airway protection, manage vent settings  - Pt requiring increased O2, increased to 100%, likely in the setting of aspiration  -cont coverage for asp PNA.  - f/u ABG     #GI  - OGT in place  - TF held due to residuals and coffee ground emesis, OG tube to suction  - PPI    #Renal  - Cr 2.4 -> 2.3 still with poor UO    #ID  - WC downtrending,   - C/w zosyn   - Bcx drawn - NGTD, sputum - few variable rods and pos cocci    #Heme  -On Xarelto & ASA at home  -Holding all AC and anti-platelet for now, s/p reversal and 1u platelets    #Endo  -no active issues    #DVT ppx  -SCDs    #GOC  -2/7- Palliative team will speak with family to discern their understanding of pt's GOC  -explained to grandsons (Marshall and Néstor) regarding the very poor prognosis of the bleed. Grandsons will hold family meeting regarding further GOC.   2/4- Spoke with grandson, communicated poor prognosis, as of now pt remains full code and son wants everything done. He will discuss with family. Palliative consulted.

## 2021-02-08 NOTE — CHART NOTE - NSCHARTNOTEFT_GEN_A_CORE
Called to evaluate the patient for cardiopulmonary arrest.     On physical exam, patient did not respond to verbal or physical stimuli. No spontaneous respirations.  Absent heart and breath sounds. Absent radial, femoral, and carotid pulses.  Pupils are fixed and dilated, absent PERRLA, no corneal reflex.  EKG rhythm strip shows aystole.   Patient pronounced dead at 7:05 pm on 2/8/2021. Attending, Dr. Rivas notified regarding patient's status.  Family grandson notified. Autopsy denied.    Nadya Benjamin, PGY-1

## 2021-02-08 NOTE — PROGRESS NOTE ADULT - PROBLEM SELECTOR PLAN 3
spoke with sia  goals remain the same   continue current medical management  not interested in compassionate extubation  would be amendable to trach/peg if needed and understands pt would need to reside in snf.

## 2021-02-08 NOTE — PROGRESS NOTE ADULT - PROBLEM SELECTOR PLAN 1
no surgical intervention  supportive care as tolerated  no reason to believe pt will have any meaningful recovery

## 2021-02-08 NOTE — PROGRESS NOTE ADULT - SUBJECTIVE AND OBJECTIVE BOX
SUBJECTIVE AND OBJECTIVE:  pt intubated, no meaningful movement, unresponsive.  no sedation  INTERVAL HPI/OVERNIGHT EVENTS:    DNR on chart:   Allergies    No Known Allergies    Intolerances    MEDICATIONS  (STANDING):  aMIOdarone    Tablet 200 milliGRAM(s) Oral daily  chlorhexidine 0.12% Liquid 15 milliLiter(s) Oral Mucosa every 12 hours  chlorhexidine 4% Liquid 1 Application(s) Topical <User Schedule>  fentaNYL   Infusion. 0.5 MICROgram(s)/kG/Hr (4 mL/Hr) IV Continuous <Continuous>  levETIRAcetam  IVPB 500 milliGRAM(s) IV Intermittent every 12 hours  pantoprazole  Injectable 40 milliGRAM(s) IV Push two times a day  phenylephrine    Infusion 0.1 MICROgram(s)/kG/Min (3 mL/Hr) IV Continuous <Continuous>  piperacillin/tazobactam IVPB.. 3.375 Gram(s) IV Intermittent every 8 hours    MEDICATIONS  (PRN):  acetaminophen    Suspension .. 650 milliGRAM(s) Oral every 6 hours PRN Temp greater or equal to 38C (100.4F)      ITEMS UNCHECKED ARE NOT PRESENT    PRESENT SYMPTOMS: [x ]Unable to obtain due to poor mentation   Source if other than patient:  [ ]Family   [ ]Team     Pain (Impact on QOL):    Location:  Minimal acceptable level (0-10 scale):            Aggravating factors:  Quality:  Radiation:  Severity (0-10 scale):    Timing:    Dyspnea:                           [ ]Mild [ ]Moderate [ ]Severe  Anxiety:                             [ ]Mild [ ]Moderate [ ]Severe  Fatigue:                             [ ]Mild [ ]Moderate [ ]Severe  Nausea:                             [ ]Mild [ ]Moderate [ ]Severe  Loss of appetite:              [ ]Mild [ ]Moderate [ ]Severe  Constipation:                    [ ]Mild [ ]Moderate [ ]Severe    PAIN AD Score:	  http://geriatrictoolkit.[place].[email or web address]/cog/painad.pdf (Ctrl + left click to view)    Other Symptoms:  [ ]All other review of systems negative     Karnofsky Performance Score/Palliative Performance Status Version 2:     10    %    http://palliative.info/resource_material/PPSv2.pdf  PHYSICAL EXAM:  Vital Signs Last 24 Hrs  T(C): 37.8 (08 Feb 2021 12:00), Max: 38.4 (08 Feb 2021 08:00)  T(F): 100 (08 Feb 2021 12:00), Max: 101.1 (08 Feb 2021 08:00)  HR: 125 (08 Feb 2021 14:34) (86 - 163)  BP: 101/65 (08 Feb 2021 12:00) (91/80 - 103/51)  BP(mean): 74 (08 Feb 2021 12:00) (63 - 85)  RR: 25 (08 Feb 2021 12:00) (18 - 31)  SpO2: 88% (08 Feb 2021 12:00) (88% - 95%) I&O's Summary    07 Feb 2021 07:01  -  08 Feb 2021 07:00  --------------------------------------------------------  IN: 2847.3 mL / OUT: 840 mL / NET: 2007.3 mL     GENERAL:  [ ]Alert  [ ]Oriented x   [ ]Lethargic  [ ]Cachexia  x[ ]Unarousable  [ ]Verbal  [x ]Non-Verbal    Behavioral:   [ ] Anxiety  [ ] Delirium [ ] Agitation [ ] Other    HEENT:  [ ]Normal   [ ]Dry mouth   [x ]ET Tube/Trach  [ ]Oral lesions    PULMONARY:   [x ]Clear [ ]Tachypnea  [ ]Audible excessive secretions   [ ]Rhonchi        [ ]Right [ ]Left [ ]Bilateral  [ ]Crackles        [ ]Right [ ]Left [ ]Bilateral  [ ]Wheezing     [ ]Right [ ]Left [ ]Bilateral    CARDIOVASCULAR:    [ ]Regular [ ]Irregular [x ]Tachy  [ ]Barry [ ]Murmur [ ]Other    GASTROINTESTINAL:  [ x]Soft  [ ]Distended   [x ]+BS  [ x]Non tender [ ]Tender  [ ]PEG [x ]OGT/ NGT   Last BM:    GENITOURINARY:  [ ]Normal [ ] Incontinent   [ ]Oliguria/Anuria   [ x]Jesus    MUSCULOSKELETAL:   [ ]Normal   [ ]Weakness  [ x]Bed/Wheelchair bound [ ]Edema    NEUROLOGIC:   [ ]No focal deficits  [ ] Cognitive impairment  [x ] Dysphagia [x ]Dysarthria [ x] Paresis [ ]Other     SKIN:   [ x]Normal   [ ]Pressure ulcer(s)  [ ]Rash    CRITICAL CARE:  [ ] Shock Present  [ ]Septic [ ]Cardiogenic [ ]Neurologic [ ]Hypovolemic  [ ]  Vasopressors [ ]  Inotropes   [ ] Respiratory failure present  [ ] Acute  [ ] Chronic [ ] Hypoxic  [ ] Hypercarbic [ ] Other  [ ] Other organ failure     LABS:                        9.8    10.46 )-----------( 85       ( 08 Feb 2021 01:33 )             31.7   02-08    135  |  109<H>  |  38<H>  ----------------------------<  105<H>  4.0   |  15<L>  |  2.28<H>    Ca    6.5<LL>      08 Feb 2021 01:33  Phos  3.2     02-08  Mg     1.6     02-08    TPro  5.0<L>  /  Alb  1.8<L>  /  TBili  1.2  /  DBili  x   /  AST  64<H>  /  ALT  31  /  AlkPhos  50  02-07  PT/INR - ( 08 Feb 2021 01:33 )   PT: 14.2 sec;   INR: 1.25 ratio         PTT - ( 08 Feb 2021 01:33 )  PTT:43.5 sec      RADIOLOGY & ADDITIONAL STUDIES:    Protein Calorie Malnutrition Present: [ ] yes [ ] no  [ ] PPSV2 < or = 30%  [ ] significant weight loss [ ] poor nutritional intake [ ] anasarca [ ] catabolic state Albumin, Serum: 1.8 g/dL (02-07-21 @ 17:17)  Artificial Nutrition [ ]     REFERRALS:   [ ]Chaplaincy  [ ] Hospice  [ ]Child Life  [ ]Social Work  [ ]Case management [ ]Holistic Therapy   Goals of Care Document:

## 2021-02-08 NOTE — DIETITIAN INITIAL EVALUATION ADULT. - OTHER INFO
85y Male unknown PMH presents to the ED with right side weakness intubated in ER for decline in mental status and found to have large L frontotemporal hemorrhage with midline shift and intraventricular extension, as well as a left SDH. possibly due to hypertension vs. amyloid angiopathy. Pt. on EN support , noted pt. being followed by palliative care for GOC decision.

## 2021-02-08 NOTE — PROGRESS NOTE ADULT - ATTENDING COMMENTS
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   I have personally provided 35+ minutes of critical care time concurrently with the resident/fellow; this excludes time spent on separate procedures.
85M p/w R weakness and intubated for airway protection. Found to have large L frontotemporal hemorrhage with midline shift and intravascular extension with L SDH. Unclear etiology. Not surgical candidate. SPB goal < 140. Has leukocytosis, covering for aspiration with zosyn. Also found to have HOMER and still making some urine. Will cont to monitor I/Os. Cont GOC conversation with family. Guarded prognosis.
Patient here with ams due to acute intraparenchymal hemorrhage with midline shift, acute hypoxemic respiratory failure due to the same, requiring intubation.     not a surgical candidate.  family unaware of extremely poor prognosis, ongoing GOC.  They would not want to be extremely aggressive.  Discussing elective extubation amongst themselves.  SBP goal <140  f/u abg
85M p/w R weakness and intubated for airway protection. Found to have large L frontotemporal hemorrhage with midline shift and intravascular extension with L SDH. Unclear etiology. Not surgical candidate. SPB goal < 140. More hypoxic today but bedside POCUS without significant B lines or consolidations. Has leukocytosis, covering for aspiration with zosyn. Also found to have HOMER and still making some urine. Will cont to monitor I/Os. Cont GOC conversation with family. Guarded prognosis.

## 2021-02-08 NOTE — DISCHARGE NOTE FOR THE EXPIRED PATIENT - HOSPITAL COURSE
85y Male unknown PMH presents to the ED with right side weakness intubated in ER for decline in mental status and found to have large L frontotemporal hemorrhage with midline shift and intraventricular extension, as well as a left SDH. Possibly due to hypertension vs. amyloid angiopathy. Patient presented with hemorrhagic stroke s/p xarelto reversal.  Found to have large L frontotemporal hemorrhage with midline shift and intravascular extension with L SDH. Unclear etiology. Not surgical candidate. Bedside POCUS without significant B lines or consolidations. Also found to have HOMER and still making some urine. Patient had recurrent hypotension requiring multiple vasopressors, ultimately leading to cardiac arrest and eventual death.

## 2021-02-11 LAB
CULTURE RESULTS: SIGNIFICANT CHANGE UP
CULTURE RESULTS: SIGNIFICANT CHANGE UP
SPECIMEN SOURCE: SIGNIFICANT CHANGE UP
SPECIMEN SOURCE: SIGNIFICANT CHANGE UP